# Patient Record
Sex: MALE | Race: WHITE | NOT HISPANIC OR LATINO | Employment: OTHER | URBAN - METROPOLITAN AREA
[De-identification: names, ages, dates, MRNs, and addresses within clinical notes are randomized per-mention and may not be internally consistent; named-entity substitution may affect disease eponyms.]

---

## 2017-05-09 ENCOUNTER — AMBULATORY - HEALTHEAST (OUTPATIENT)
Dept: INTERNAL MEDICINE | Facility: CLINIC | Age: 67
End: 2017-05-09

## 2017-05-09 DIAGNOSIS — Z12.11 SCREEN FOR COLON CANCER: ICD-10-CM

## 2017-06-01 ENCOUNTER — RECORDS - HEALTHEAST (OUTPATIENT)
Dept: ADMINISTRATIVE | Facility: OTHER | Age: 67
End: 2017-06-01

## 2017-06-06 ENCOUNTER — OFFICE VISIT - HEALTHEAST (OUTPATIENT)
Dept: INTERNAL MEDICINE | Facility: CLINIC | Age: 67
End: 2017-06-06

## 2017-06-06 DIAGNOSIS — H57.9 ITCHY EYES: ICD-10-CM

## 2017-06-06 DIAGNOSIS — Z78.9 NONSMOKER: ICD-10-CM

## 2017-06-06 DIAGNOSIS — R06.02 SHORTNESS OF BREATH: ICD-10-CM

## 2017-06-06 DIAGNOSIS — Z12.5 SCREENING FOR PROSTATE CANCER: ICD-10-CM

## 2017-06-06 DIAGNOSIS — Z00.00 MEDICARE ANNUAL WELLNESS VISIT, SUBSEQUENT: ICD-10-CM

## 2017-06-06 DIAGNOSIS — I35.0 AORTIC STENOSIS: ICD-10-CM

## 2017-06-06 DIAGNOSIS — Z78.9 FULL CODE STATUS: ICD-10-CM

## 2017-06-06 DIAGNOSIS — N52.9 ED (ERECTILE DYSFUNCTION): ICD-10-CM

## 2017-06-06 DIAGNOSIS — E78.5 HLD (HYPERLIPIDEMIA): ICD-10-CM

## 2017-06-06 DIAGNOSIS — M17.0 OSTEOARTHRITIS OF BOTH KNEES: ICD-10-CM

## 2017-06-06 DIAGNOSIS — Z86.69 H/O IRITIS: ICD-10-CM

## 2017-06-06 LAB
CHOLEST SERPL-MCNC: 188 MG/DL
FASTING STATUS PATIENT QL REPORTED: ABNORMAL
HDLC SERPL-MCNC: 43 MG/DL
LDLC SERPL CALC-MCNC: 110 MG/DL
PSA SERPL-MCNC: 2.2 NG/ML (ref 0–4.5)
TRIGL SERPL-MCNC: 176 MG/DL

## 2017-06-06 ASSESSMENT — MIFFLIN-ST. JEOR: SCORE: 1912.94

## 2017-06-13 ENCOUNTER — RECORDS - HEALTHEAST (OUTPATIENT)
Dept: ADMINISTRATIVE | Facility: OTHER | Age: 67
End: 2017-06-13

## 2017-06-28 ENCOUNTER — RECORDS - HEALTHEAST (OUTPATIENT)
Dept: ADMINISTRATIVE | Facility: OTHER | Age: 67
End: 2017-06-28

## 2017-06-29 ENCOUNTER — RECORDS - HEALTHEAST (OUTPATIENT)
Dept: ADMINISTRATIVE | Facility: OTHER | Age: 67
End: 2017-06-29

## 2017-08-17 ENCOUNTER — COMMUNICATION - HEALTHEAST (OUTPATIENT)
Dept: INTERNAL MEDICINE | Facility: CLINIC | Age: 67
End: 2017-08-17

## 2017-08-17 DIAGNOSIS — E78.5 HLD (HYPERLIPIDEMIA): ICD-10-CM

## 2018-06-05 ENCOUNTER — OFFICE VISIT - HEALTHEAST (OUTPATIENT)
Dept: INTERNAL MEDICINE | Facility: CLINIC | Age: 68
End: 2018-06-05

## 2018-06-05 DIAGNOSIS — N52.9 ED (ERECTILE DYSFUNCTION): ICD-10-CM
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
## 2018-06-05 DIAGNOSIS — Z78.9 FULL CODE STATUS: ICD-10-CM

## 2018-06-05 DIAGNOSIS — H91.90 HEARING LOSS: ICD-10-CM

## 2018-06-05 DIAGNOSIS — I35.0 AORTIC STENOSIS: ICD-10-CM

## 2018-06-05 DIAGNOSIS — Z00.00 MEDICARE ANNUAL WELLNESS VISIT, SUBSEQUENT: ICD-10-CM

## 2018-06-05 DIAGNOSIS — M17.0 OSTEOARTHRITIS OF BOTH KNEES: ICD-10-CM

## 2018-06-05 DIAGNOSIS — Z78.9 NONSMOKER: ICD-10-CM

## 2018-06-05 DIAGNOSIS — E78.5 HLD (HYPERLIPIDEMIA): ICD-10-CM

## 2018-06-05 DIAGNOSIS — Z71.89 ADVANCE DIRECTIVE DISCUSSED WITH PATIENT: ICD-10-CM

## 2018-06-05 DIAGNOSIS — R06.00 DYSPNEA: ICD-10-CM

## 2018-06-05 LAB
ALBUMIN SERPL-MCNC: 4.2 G/DL (ref 3.5–5)
ALP SERPL-CCNC: 89 U/L (ref 45–120)
ALT SERPL W P-5'-P-CCNC: 16 U/L (ref 0–45)
ANION GAP SERPL CALCULATED.3IONS-SCNC: 9 MMOL/L (ref 5–18)
AST SERPL W P-5'-P-CCNC: 22 U/L (ref 0–40)
BILIRUB SERPL-MCNC: 0.7 MG/DL (ref 0–1)
BNP SERPL-MCNC: 28 PG/ML (ref 0–64)
BUN SERPL-MCNC: 15 MG/DL (ref 8–22)
CALCIUM SERPL-MCNC: 9.6 MG/DL (ref 8.5–10.5)
CHLORIDE BLD-SCNC: 108 MMOL/L (ref 98–107)
CHOLEST SERPL-MCNC: 184 MG/DL
CO2 SERPL-SCNC: 25 MMOL/L (ref 22–31)
CREAT SERPL-MCNC: 1.08 MG/DL (ref 0.7–1.3)
ERYTHROCYTE [DISTWIDTH] IN BLOOD BY AUTOMATED COUNT: 11.1 % (ref 11–14.5)
FASTING STATUS PATIENT QL REPORTED: YES
GFR SERPL CREATININE-BSD FRML MDRD: >60 ML/MIN/1.73M2
GLUCOSE BLD-MCNC: 100 MG/DL (ref 70–125)
HCT VFR BLD AUTO: 46.9 % (ref 40–54)
HDLC SERPL-MCNC: 44 MG/DL
HGB BLD-MCNC: 15.8 G/DL (ref 14–18)
LDLC SERPL CALC-MCNC: 111 MG/DL
MCH RBC QN AUTO: 31.8 PG (ref 27–34)
MCHC RBC AUTO-ENTMCNC: 33.6 G/DL (ref 32–36)
MCV RBC AUTO: 95 FL (ref 80–100)
PLATELET # BLD AUTO: 189 THOU/UL (ref 140–440)
PMV BLD AUTO: 7.9 FL (ref 7–10)
POTASSIUM BLD-SCNC: 4.6 MMOL/L (ref 3.5–5)
PROT SERPL-MCNC: 7.2 G/DL (ref 6–8)
RBC # BLD AUTO: 4.96 MILL/UL (ref 4.4–6.2)
SODIUM SERPL-SCNC: 142 MMOL/L (ref 136–145)
TRIGL SERPL-MCNC: 143 MG/DL
TSH SERPL DL<=0.005 MIU/L-ACNC: 1.95 UIU/ML (ref 0.3–5)
WBC: 8 THOU/UL (ref 4–11)

## 2018-06-05 ASSESSMENT — MIFFLIN-ST. JEOR: SCORE: 1832.53

## 2018-06-06 ENCOUNTER — RECORDS - HEALTHEAST (OUTPATIENT)
Dept: ADMINISTRATIVE | Facility: OTHER | Age: 68
End: 2018-06-06

## 2018-06-15 ENCOUNTER — RECORDS - HEALTHEAST (OUTPATIENT)
Dept: ADMINISTRATIVE | Facility: OTHER | Age: 68
End: 2018-06-15

## 2018-06-26 ENCOUNTER — COMMUNICATION - HEALTHEAST (OUTPATIENT)
Dept: AUDIOLOGY | Facility: CLINIC | Age: 68
End: 2018-06-26

## 2019-06-07 ENCOUNTER — OFFICE VISIT - HEALTHEAST (OUTPATIENT)
Dept: INTERNAL MEDICINE | Facility: CLINIC | Age: 69
End: 2019-06-07

## 2019-06-07 DIAGNOSIS — E78.2 MIXED HYPERLIPIDEMIA: ICD-10-CM

## 2019-06-07 DIAGNOSIS — R20.2 TINGLING OF BOTH FEET: ICD-10-CM

## 2019-06-07 DIAGNOSIS — M17.0 PRIMARY OSTEOARTHRITIS OF BOTH KNEES: ICD-10-CM

## 2019-06-07 DIAGNOSIS — R79.89 OTHER SPECIFIED ABNORMAL FINDINGS OF BLOOD CHEMISTRY: ICD-10-CM

## 2019-06-07 DIAGNOSIS — R73.9 ELEVATED BLOOD SUGAR: ICD-10-CM

## 2019-06-07 DIAGNOSIS — Z00.00 MEDICARE ANNUAL WELLNESS VISIT, SUBSEQUENT: ICD-10-CM

## 2019-06-07 DIAGNOSIS — I35.0 NONRHEUMATIC AORTIC VALVE STENOSIS: ICD-10-CM

## 2019-06-07 DIAGNOSIS — E66.01 MORBID OBESITY (H): ICD-10-CM

## 2019-06-07 DIAGNOSIS — Z12.5 SCREENING FOR PROSTATE CANCER: ICD-10-CM

## 2019-06-07 LAB
ALBUMIN SERPL-MCNC: 4 G/DL (ref 3.5–5)
ALP SERPL-CCNC: 75 U/L (ref 45–120)
ALT SERPL W P-5'-P-CCNC: 17 U/L (ref 0–45)
AST SERPL W P-5'-P-CCNC: 20 U/L (ref 0–40)
BILIRUB DIRECT SERPL-MCNC: 0.2 MG/DL
BILIRUB SERPL-MCNC: 0.4 MG/DL (ref 0–1)
HBA1C MFR BLD: 5.7 % (ref 3.5–6)
IRON SATN MFR SERPL: 38 % (ref 20–50)
IRON SERPL-MCNC: 122 UG/DL (ref 42–175)
PROT SERPL-MCNC: 6.8 G/DL (ref 6–8)
PSA SERPL-MCNC: 2 NG/ML (ref 0–4.5)
TIBC SERPL-MCNC: 325 UG/DL (ref 313–563)
TRANSFERRIN SERPL-MCNC: 260 MG/DL (ref 212–360)
VIT B12 SERPL-MCNC: 234 PG/ML (ref 213–816)

## 2019-06-07 ASSESSMENT — MIFFLIN-ST. JEOR: SCORE: 1836.61

## 2019-06-11 LAB — METHYLMALONATE SERPL-SCNC: 0.59 UMOL/L (ref 0–0.4)

## 2019-09-06 ENCOUNTER — RECORDS - HEALTHEAST (OUTPATIENT)
Dept: ADMINISTRATIVE | Facility: OTHER | Age: 69
End: 2019-09-06

## 2019-09-07 ENCOUNTER — RECORDS - HEALTHEAST (OUTPATIENT)
Dept: ADMINISTRATIVE | Facility: OTHER | Age: 69
End: 2019-09-07

## 2019-09-08 ENCOUNTER — COMMUNICATION - HEALTHEAST (OUTPATIENT)
Dept: INTERNAL MEDICINE | Facility: CLINIC | Age: 69
End: 2019-09-08

## 2019-09-09 ENCOUNTER — RECORDS - HEALTHEAST (OUTPATIENT)
Dept: ADMINISTRATIVE | Facility: OTHER | Age: 69
End: 2019-09-09

## 2019-09-10 ENCOUNTER — RECORDS - HEALTHEAST (OUTPATIENT)
Dept: ADMINISTRATIVE | Facility: OTHER | Age: 69
End: 2019-09-10

## 2019-09-11 ENCOUNTER — RECORDS - HEALTHEAST (OUTPATIENT)
Dept: ADMINISTRATIVE | Facility: OTHER | Age: 69
End: 2019-09-11

## 2019-09-16 ENCOUNTER — AMBULATORY - HEALTHEAST (OUTPATIENT)
Dept: INTERNAL MEDICINE | Facility: CLINIC | Age: 69
End: 2019-09-16

## 2019-09-16 ENCOUNTER — COMMUNICATION - HEALTHEAST (OUTPATIENT)
Dept: INTERNAL MEDICINE | Facility: CLINIC | Age: 69
End: 2019-09-16

## 2019-10-02 ENCOUNTER — RECORDS - HEALTHEAST (OUTPATIENT)
Dept: ADMINISTRATIVE | Facility: OTHER | Age: 69
End: 2019-10-02

## 2019-10-04 ENCOUNTER — COMMUNICATION - HEALTHEAST (OUTPATIENT)
Dept: INTERNAL MEDICINE | Facility: CLINIC | Age: 69
End: 2019-10-04

## 2019-10-09 ENCOUNTER — RECORDS - HEALTHEAST (OUTPATIENT)
Dept: LAB | Facility: CLINIC | Age: 69
End: 2019-10-09

## 2019-10-09 LAB
ANION GAP SERPL CALCULATED.3IONS-SCNC: 10 MMOL/L (ref 5–18)
BUN SERPL-MCNC: 14 MG/DL (ref 8–22)
CALCIUM SERPL-MCNC: 10.4 MG/DL (ref 8.5–10.5)
CHLORIDE BLD-SCNC: 106 MMOL/L (ref 98–107)
CO2 SERPL-SCNC: 25 MMOL/L (ref 22–31)
CREAT SERPL-MCNC: 1.19 MG/DL (ref 0.7–1.3)
GFR SERPL CREATININE-BSD FRML MDRD: >60 ML/MIN/1.73M2
GLUCOSE BLD-MCNC: 141 MG/DL (ref 70–125)
POTASSIUM BLD-SCNC: 3.6 MMOL/L (ref 3.5–5)
SODIUM SERPL-SCNC: 141 MMOL/L (ref 136–145)

## 2019-10-15 ENCOUNTER — RECORDS - HEALTHEAST (OUTPATIENT)
Dept: LAB | Facility: CLINIC | Age: 69
End: 2019-10-15

## 2019-10-15 LAB
ALBUMIN UR-MCNC: ABNORMAL MG/DL
APPEARANCE UR: ABNORMAL
BACTERIA #/AREA URNS HPF: ABNORMAL HPF
BILIRUB UR QL STRIP: NEGATIVE
COLOR UR AUTO: YELLOW
GLUCOSE UR STRIP-MCNC: NEGATIVE MG/DL
HGB UR QL STRIP: ABNORMAL
KETONES UR STRIP-MCNC: NEGATIVE MG/DL
LEUKOCYTE ESTERASE UR QL STRIP: ABNORMAL
MUCOUS THREADS #/AREA URNS LPF: ABNORMAL LPF
NITRATE UR QL: NEGATIVE
PH UR STRIP: 5 [PH] (ref 4.5–8)
RBC #/AREA URNS AUTO: ABNORMAL HPF
SP GR UR STRIP: 1.02 (ref 1–1.03)
SQUAMOUS #/AREA URNS AUTO: ABNORMAL LPF
UROBILINOGEN UR STRIP-ACNC: ABNORMAL
WBC #/AREA URNS AUTO: >100 HPF
WBC CLUMPS #/AREA URNS HPF: PRESENT /[HPF]

## 2019-10-17 LAB — BACTERIA SPEC CULT: ABNORMAL

## 2019-10-28 ENCOUNTER — AMBULATORY - HEALTHEAST (OUTPATIENT)
Dept: INTERNAL MEDICINE | Facility: CLINIC | Age: 69
End: 2019-10-28

## 2019-11-12 ENCOUNTER — COMMUNICATION - HEALTHEAST (OUTPATIENT)
Dept: INTERNAL MEDICINE | Facility: CLINIC | Age: 69
End: 2019-11-12

## 2019-11-19 ENCOUNTER — COMMUNICATION - HEALTHEAST (OUTPATIENT)
Dept: INTERNAL MEDICINE | Facility: CLINIC | Age: 69
End: 2019-11-19

## 2019-12-03 ENCOUNTER — RECORDS - HEALTHEAST (OUTPATIENT)
Dept: LAB | Facility: CLINIC | Age: 69
End: 2019-12-03

## 2019-12-03 LAB
ANION GAP SERPL CALCULATED.3IONS-SCNC: 10 MMOL/L (ref 5–18)
BASOPHILS # BLD AUTO: 0.1 THOU/UL (ref 0–0.2)
BASOPHILS NFR BLD AUTO: 1 % (ref 0–2)
BUN SERPL-MCNC: 10 MG/DL (ref 8–22)
C REACTIVE PROTEIN LHE: 0.4 MG/DL (ref 0–0.8)
CALCIUM SERPL-MCNC: 9.8 MG/DL (ref 8.5–10.5)
CHLORIDE BLD-SCNC: 105 MMOL/L (ref 98–107)
CO2 SERPL-SCNC: 26 MMOL/L (ref 22–31)
CREAT SERPL-MCNC: 0.98 MG/DL (ref 0.7–1.3)
EOSINOPHIL # BLD AUTO: 0.3 THOU/UL (ref 0–0.4)
EOSINOPHIL NFR BLD AUTO: 3 % (ref 0–6)
ERYTHROCYTE [DISTWIDTH] IN BLOOD BY AUTOMATED COUNT: 13.7 % (ref 11–14.5)
GFR SERPL CREATININE-BSD FRML MDRD: >60 ML/MIN/1.73M2
GLUCOSE BLD-MCNC: 141 MG/DL (ref 70–125)
HCT VFR BLD AUTO: 45.6 % (ref 40–54)
HGB BLD-MCNC: 15.4 G/DL (ref 14–18)
LYMPHOCYTES # BLD AUTO: 3.3 THOU/UL (ref 0.8–4.4)
LYMPHOCYTES NFR BLD AUTO: 27 % (ref 20–40)
MCH RBC QN AUTO: 31.4 PG (ref 27–34)
MCHC RBC AUTO-ENTMCNC: 33.8 G/DL (ref 32–36)
MCV RBC AUTO: 93 FL (ref 80–100)
MONOCYTES # BLD AUTO: 0.8 THOU/UL (ref 0–0.9)
MONOCYTES NFR BLD AUTO: 6 % (ref 2–10)
NEUTROPHILS # BLD AUTO: 7.8 THOU/UL (ref 2–7.7)
NEUTROPHILS NFR BLD AUTO: 64 % (ref 50–70)
PLATELET # BLD AUTO: 296 THOU/UL (ref 140–440)
PMV BLD AUTO: 11.5 FL (ref 8.5–12.5)
POTASSIUM BLD-SCNC: 3.6 MMOL/L (ref 3.5–5)
RBC # BLD AUTO: 4.9 MILL/UL (ref 4.4–6.2)
SODIUM SERPL-SCNC: 141 MMOL/L (ref 136–145)
WBC: 12.3 THOU/UL (ref 4–11)

## 2019-12-05 LAB
ALBUMIN PERCENT: 60.3 % (ref 51–67)
ALBUMIN SERPL ELPH-MCNC: 4.1 G/DL (ref 3.2–4.7)
ALPHA 1 PERCENT: 2 % (ref 2–4)
ALPHA 2 PERCENT: 12.9 % (ref 5–13)
ALPHA1 GLOB SERPL ELPH-MCNC: 0.1 G/DL (ref 0.1–0.3)
ALPHA2 GLOB SERPL ELPH-MCNC: 0.9 G/DL (ref 0.4–0.9)
B-GLOBULIN SERPL ELPH-MCNC: 1 G/DL (ref 0.7–1.2)
BETA PERCENT: 14.1 % (ref 10–17)
GAMMA GLOB SERPL ELPH-MCNC: 0.7 G/DL (ref 0.6–1.4)
GAMMA GLOBULIN PERCENT: 10.7 % (ref 9–20)
PATH ICD:: NORMAL
PROT PATTERN SERPL ELPH-IMP: NORMAL
PROT SERPL-MCNC: 6.8 G/DL (ref 6–8)
REVIEWING PATHOLOGIST: NORMAL

## 2019-12-09 ENCOUNTER — OFFICE VISIT - HEALTHEAST (OUTPATIENT)
Dept: INTERNAL MEDICINE | Facility: CLINIC | Age: 69
End: 2019-12-09

## 2019-12-09 ENCOUNTER — RECORDS - HEALTHEAST (OUTPATIENT)
Dept: ADMINISTRATIVE | Facility: OTHER | Age: 69
End: 2019-12-09

## 2019-12-09 DIAGNOSIS — I63.411 CEREBROVASCULAR ACCIDENT (CVA) DUE TO EMBOLISM OF RIGHT MIDDLE CEREBRAL ARTERY (H): ICD-10-CM

## 2019-12-09 DIAGNOSIS — I35.0 NONRHEUMATIC AORTIC VALVE STENOSIS: ICD-10-CM

## 2019-12-09 DIAGNOSIS — S42.152A GLENOID FRACTURE OF SHOULDER, LEFT, CLOSED, INITIAL ENCOUNTER: ICD-10-CM

## 2019-12-09 DIAGNOSIS — M17.0 PRIMARY OSTEOARTHRITIS OF BOTH KNEES: ICD-10-CM

## 2019-12-09 DIAGNOSIS — E66.01 MORBID OBESITY (H): ICD-10-CM

## 2019-12-09 DIAGNOSIS — E78.2 MIXED HYPERLIPIDEMIA: ICD-10-CM

## 2019-12-09 DIAGNOSIS — M25.532 LEFT WRIST PAIN: ICD-10-CM

## 2019-12-09 DIAGNOSIS — I69.359 HEMIPLEGIA AS LATE EFFECT OF CEREBROVASCULAR ACCIDENT (CVA) (H): ICD-10-CM

## 2019-12-09 DIAGNOSIS — S42.142A GLENOID FRACTURE OF SHOULDER, LEFT, CLOSED, INITIAL ENCOUNTER: ICD-10-CM

## 2019-12-09 DIAGNOSIS — S42.255A CLOSED NONDISPLACED FRACTURE OF GREATER TUBEROSITY OF LEFT HUMERUS, INITIAL ENCOUNTER: ICD-10-CM

## 2019-12-09 DIAGNOSIS — I69.391 DYSPHAGIA AS LATE EFFECT OF CEREBROVASCULAR ACCIDENT (CVA): ICD-10-CM

## 2020-01-12 ENCOUNTER — RECORDS - HEALTHEAST (OUTPATIENT)
Dept: ADMINISTRATIVE | Facility: OTHER | Age: 70
End: 2020-01-12

## 2020-01-15 ENCOUNTER — RECORDS - HEALTHEAST (OUTPATIENT)
Dept: ADMINISTRATIVE | Facility: OTHER | Age: 70
End: 2020-01-15

## 2020-01-16 ENCOUNTER — RECORDS - HEALTHEAST (OUTPATIENT)
Dept: ADMINISTRATIVE | Facility: OTHER | Age: 70
End: 2020-01-16

## 2020-01-27 ENCOUNTER — RECORDS - HEALTHEAST (OUTPATIENT)
Dept: LAB | Facility: CLINIC | Age: 70
End: 2020-01-27

## 2020-01-28 LAB
ANION GAP SERPL CALCULATED.3IONS-SCNC: 11 MMOL/L (ref 5–18)
BUN SERPL-MCNC: 9 MG/DL (ref 8–28)
CALCIUM SERPL-MCNC: 10.2 MG/DL (ref 8.5–10.5)
CHLORIDE BLD-SCNC: 103 MMOL/L (ref 98–107)
CO2 SERPL-SCNC: 26 MMOL/L (ref 22–31)
CREAT SERPL-MCNC: 0.97 MG/DL (ref 0.7–1.3)
ERYTHROCYTE [DISTWIDTH] IN BLOOD BY AUTOMATED COUNT: 15.5 % (ref 11–14.5)
GFR SERPL CREATININE-BSD FRML MDRD: >60 ML/MIN/1.73M2
GLUCOSE BLD-MCNC: 124 MG/DL (ref 70–125)
HCT VFR BLD AUTO: 38.4 % (ref 40–54)
HGB BLD-MCNC: 12.1 G/DL (ref 14–18)
MCH RBC QN AUTO: 31.2 PG (ref 27–34)
MCHC RBC AUTO-ENTMCNC: 31.5 G/DL (ref 32–36)
MCV RBC AUTO: 99 FL (ref 80–100)
PLATELET # BLD AUTO: 541 THOU/UL (ref 140–440)
PMV BLD AUTO: 11.1 FL (ref 8.5–12.5)
POTASSIUM BLD-SCNC: 3.6 MMOL/L (ref 3.5–5)
RBC # BLD AUTO: 3.88 MILL/UL (ref 4.4–6.2)
SODIUM SERPL-SCNC: 140 MMOL/L (ref 136–145)
WBC: 11.2 THOU/UL (ref 4–11)

## 2020-03-23 ENCOUNTER — COMMUNICATION - HEALTHEAST (OUTPATIENT)
Dept: INTERNAL MEDICINE | Facility: CLINIC | Age: 70
End: 2020-03-23

## 2020-03-23 ENCOUNTER — RECORDS - HEALTHEAST (OUTPATIENT)
Dept: ADMINISTRATIVE | Facility: OTHER | Age: 70
End: 2020-03-23

## 2020-03-23 DIAGNOSIS — Z95.3 S/P AORTIC VALVE REPLACEMENT WITH BIOPROSTHETIC VALVE: ICD-10-CM

## 2020-03-23 LAB — INR PPP: 2.3 (ref 0.9–1.1)

## 2020-03-27 ENCOUNTER — RECORDS - HEALTHEAST (OUTPATIENT)
Dept: ADMINISTRATIVE | Facility: OTHER | Age: 70
End: 2020-03-27

## 2020-03-27 ENCOUNTER — COMMUNICATION - HEALTHEAST (OUTPATIENT)
Dept: INTERNAL MEDICINE | Facility: CLINIC | Age: 70
End: 2020-03-27

## 2020-03-27 ENCOUNTER — OFFICE VISIT - HEALTHEAST (OUTPATIENT)
Dept: INTERNAL MEDICINE | Facility: CLINIC | Age: 70
End: 2020-03-27

## 2020-03-27 DIAGNOSIS — E78.2 MIXED HYPERLIPIDEMIA: ICD-10-CM

## 2020-03-27 DIAGNOSIS — N13.8 BENIGN PROSTATIC HYPERPLASIA WITH URINARY OBSTRUCTION: ICD-10-CM

## 2020-03-27 DIAGNOSIS — N40.1 BENIGN PROSTATIC HYPERPLASIA WITH URINARY OBSTRUCTION: ICD-10-CM

## 2020-03-27 DIAGNOSIS — F32.1 MODERATE MAJOR DEPRESSION (H): ICD-10-CM

## 2020-03-27 DIAGNOSIS — M17.0 PRIMARY OSTEOARTHRITIS OF BOTH KNEES: ICD-10-CM

## 2020-03-27 DIAGNOSIS — K59.00 CONSTIPATION, UNSPECIFIED CONSTIPATION TYPE: ICD-10-CM

## 2020-03-27 DIAGNOSIS — E66.01 MORBID OBESITY (H): ICD-10-CM

## 2020-03-27 DIAGNOSIS — I63.411 CEREBROVASCULAR ACCIDENT (CVA) DUE TO EMBOLISM OF RIGHT MIDDLE CEREBRAL ARTERY (H): ICD-10-CM

## 2020-03-27 DIAGNOSIS — I69.359 HEMIPLEGIA AS LATE EFFECT OF CEREBROVASCULAR ACCIDENT (CVA) (H): ICD-10-CM

## 2020-03-27 DIAGNOSIS — I10 ESSENTIAL HYPERTENSION: ICD-10-CM

## 2020-03-27 DIAGNOSIS — Z95.3 S/P AORTIC VALVE REPLACEMENT WITH BIOPROSTHETIC VALVE: ICD-10-CM

## 2020-03-30 ENCOUNTER — COMMUNICATION - HEALTHEAST (OUTPATIENT)
Dept: INTERNAL MEDICINE | Facility: CLINIC | Age: 70
End: 2020-03-30

## 2020-03-30 DIAGNOSIS — Z95.3 S/P AORTIC VALVE REPLACEMENT WITH BIOPROSTHETIC VALVE: ICD-10-CM

## 2020-03-30 LAB — INR PPP: 2.3 (ref 0.9–1.1)

## 2020-04-01 ENCOUNTER — COMMUNICATION - HEALTHEAST (OUTPATIENT)
Dept: INTERNAL MEDICINE | Facility: CLINIC | Age: 70
End: 2020-04-01

## 2020-04-02 ENCOUNTER — COMMUNICATION - HEALTHEAST (OUTPATIENT)
Dept: INTERNAL MEDICINE | Facility: CLINIC | Age: 70
End: 2020-04-02

## 2020-04-02 DIAGNOSIS — F32.1 MODERATE MAJOR DEPRESSION (H): ICD-10-CM

## 2020-04-03 ENCOUNTER — RECORDS - HEALTHEAST (OUTPATIENT)
Dept: ADMINISTRATIVE | Facility: OTHER | Age: 70
End: 2020-04-03

## 2020-04-06 ENCOUNTER — COMMUNICATION - HEALTHEAST (OUTPATIENT)
Dept: INTERNAL MEDICINE | Facility: CLINIC | Age: 70
End: 2020-04-06

## 2020-04-06 DIAGNOSIS — Z95.3 S/P AORTIC VALVE REPLACEMENT WITH BIOPROSTHETIC VALVE: ICD-10-CM

## 2020-04-06 LAB — INR PPP: 2 (ref 0.9–1.1)

## 2020-04-07 ENCOUNTER — RECORDS - HEALTHEAST (OUTPATIENT)
Dept: ADMINISTRATIVE | Facility: OTHER | Age: 70
End: 2020-04-07

## 2020-04-13 ENCOUNTER — COMMUNICATION - HEALTHEAST (OUTPATIENT)
Dept: FAMILY MEDICINE | Facility: CLINIC | Age: 70
End: 2020-04-13

## 2020-04-13 ENCOUNTER — COMMUNICATION - HEALTHEAST (OUTPATIENT)
Dept: INTERNAL MEDICINE | Facility: CLINIC | Age: 70
End: 2020-04-13

## 2020-04-13 DIAGNOSIS — Z95.3 S/P AORTIC VALVE REPLACEMENT WITH BIOPROSTHETIC VALVE: ICD-10-CM

## 2020-04-13 DIAGNOSIS — I10 ESSENTIAL HYPERTENSION: ICD-10-CM

## 2020-04-13 LAB — INR PPP: 2.2 (ref 0.9–1.1)

## 2020-04-17 ENCOUNTER — RECORDS - HEALTHEAST (OUTPATIENT)
Dept: ANTICOAGULATION | Facility: CLINIC | Age: 70
End: 2020-04-17

## 2020-04-20 ENCOUNTER — RECORDS - HEALTHEAST (OUTPATIENT)
Dept: ADMINISTRATIVE | Facility: OTHER | Age: 70
End: 2020-04-20

## 2020-04-27 ENCOUNTER — RECORDS - HEALTHEAST (OUTPATIENT)
Dept: ADMINISTRATIVE | Facility: OTHER | Age: 70
End: 2020-04-27

## 2020-04-27 ENCOUNTER — COMMUNICATION - HEALTHEAST (OUTPATIENT)
Dept: INTERNAL MEDICINE | Facility: CLINIC | Age: 70
End: 2020-04-27

## 2020-04-27 DIAGNOSIS — Z95.3 S/P AORTIC VALVE REPLACEMENT WITH BIOPROSTHETIC VALVE: ICD-10-CM

## 2020-04-27 LAB — INR PPP: 2.1 (ref 0.9–1.1)

## 2020-04-28 ENCOUNTER — COMMUNICATION - HEALTHEAST (OUTPATIENT)
Dept: INTERNAL MEDICINE | Facility: CLINIC | Age: 70
End: 2020-04-28

## 2020-04-28 DIAGNOSIS — Z95.3 S/P AORTIC VALVE REPLACEMENT WITH BIOPROSTHETIC VALVE: ICD-10-CM

## 2020-04-28 DIAGNOSIS — I63.411 CEREBROVASCULAR ACCIDENT (CVA) DUE TO EMBOLISM OF RIGHT MIDDLE CEREBRAL ARTERY (H): ICD-10-CM

## 2020-04-28 DIAGNOSIS — I69.391 DYSPHAGIA AS LATE EFFECT OF CEREBROVASCULAR ACCIDENT (CVA): ICD-10-CM

## 2020-04-28 DIAGNOSIS — I69.359 HEMIPLEGIA AS LATE EFFECT OF CEREBROVASCULAR ACCIDENT (CVA) (H): ICD-10-CM

## 2020-05-01 ENCOUNTER — RECORDS - HEALTHEAST (OUTPATIENT)
Dept: ADMINISTRATIVE | Facility: OTHER | Age: 70
End: 2020-05-01

## 2020-05-03 ENCOUNTER — RECORDS - HEALTHEAST (OUTPATIENT)
Dept: ADMINISTRATIVE | Facility: OTHER | Age: 70
End: 2020-05-03

## 2020-05-05 ENCOUNTER — RECORDS - HEALTHEAST (OUTPATIENT)
Dept: ADMINISTRATIVE | Facility: OTHER | Age: 70
End: 2020-05-05

## 2020-05-19 ENCOUNTER — COMMUNICATION - HEALTHEAST (OUTPATIENT)
Dept: ANTICOAGULATION | Facility: CLINIC | Age: 70
End: 2020-05-19

## 2020-05-21 ENCOUNTER — COMMUNICATION - HEALTHEAST (OUTPATIENT)
Dept: INTERNAL MEDICINE | Facility: CLINIC | Age: 70
End: 2020-05-21

## 2020-05-21 DIAGNOSIS — F32.1 MODERATE MAJOR DEPRESSION (H): ICD-10-CM

## 2020-05-26 ENCOUNTER — COMMUNICATION - HEALTHEAST (OUTPATIENT)
Dept: ANTICOAGULATION | Facility: CLINIC | Age: 70
End: 2020-05-26

## 2020-05-26 DIAGNOSIS — Z95.3 S/P AORTIC VALVE REPLACEMENT WITH BIOPROSTHETIC VALVE: ICD-10-CM

## 2020-05-26 LAB — INR PPP: 2 (ref 0.9–1.1)

## 2020-06-09 ENCOUNTER — RECORDS - HEALTHEAST (OUTPATIENT)
Dept: ADMINISTRATIVE | Facility: OTHER | Age: 70
End: 2020-06-09

## 2020-06-23 ENCOUNTER — COMMUNICATION - HEALTHEAST (OUTPATIENT)
Dept: ANTICOAGULATION | Facility: CLINIC | Age: 70
End: 2020-06-23

## 2020-06-23 DIAGNOSIS — Z95.3 S/P AORTIC VALVE REPLACEMENT WITH BIOPROSTHETIC VALVE: ICD-10-CM

## 2020-06-23 LAB — INR PPP: 1.8 (ref 0.9–1.1)

## 2020-06-24 ENCOUNTER — COMMUNICATION - HEALTHEAST (OUTPATIENT)
Dept: INTERNAL MEDICINE | Facility: CLINIC | Age: 70
End: 2020-06-24

## 2020-06-24 ENCOUNTER — AMBULATORY - HEALTHEAST (OUTPATIENT)
Dept: INTERNAL MEDICINE | Facility: CLINIC | Age: 70
End: 2020-06-24

## 2020-06-24 DIAGNOSIS — F32.1 MODERATE MAJOR DEPRESSION (H): ICD-10-CM

## 2020-07-16 ENCOUNTER — COMMUNICATION - HEALTHEAST (OUTPATIENT)
Dept: ANTICOAGULATION | Facility: CLINIC | Age: 70
End: 2020-07-16

## 2020-07-16 DIAGNOSIS — Z95.3 S/P AORTIC VALVE REPLACEMENT WITH BIOPROSTHETIC VALVE: ICD-10-CM

## 2020-07-31 ENCOUNTER — OFFICE VISIT - HEALTHEAST (OUTPATIENT)
Dept: INTERNAL MEDICINE | Facility: CLINIC | Age: 70
End: 2020-07-31

## 2020-07-31 DIAGNOSIS — M17.0 PRIMARY OSTEOARTHRITIS OF BOTH KNEES: ICD-10-CM

## 2020-07-31 DIAGNOSIS — Z95.3 S/P AORTIC VALVE REPLACEMENT WITH BIOPROSTHETIC VALVE: ICD-10-CM

## 2020-07-31 DIAGNOSIS — I69.359 HEMIPLEGIA AS LATE EFFECT OF CEREBROVASCULAR ACCIDENT (CVA) (H): ICD-10-CM

## 2020-07-31 DIAGNOSIS — I10 ESSENTIAL HYPERTENSION: ICD-10-CM

## 2020-07-31 DIAGNOSIS — N40.1 BENIGN PROSTATIC HYPERPLASIA WITH URINARY OBSTRUCTION: ICD-10-CM

## 2020-07-31 DIAGNOSIS — K59.00 CONSTIPATION, UNSPECIFIED CONSTIPATION TYPE: ICD-10-CM

## 2020-07-31 DIAGNOSIS — I69.391 DYSPHAGIA AS LATE EFFECT OF CEREBROVASCULAR ACCIDENT (CVA): ICD-10-CM

## 2020-07-31 DIAGNOSIS — N13.8 BENIGN PROSTATIC HYPERPLASIA WITH URINARY OBSTRUCTION: ICD-10-CM

## 2020-07-31 DIAGNOSIS — E78.2 MIXED HYPERLIPIDEMIA: ICD-10-CM

## 2020-07-31 DIAGNOSIS — R73.9 ELEVATED BLOOD SUGAR: ICD-10-CM

## 2020-07-31 DIAGNOSIS — H20.11 CHRONIC IRITIS OF RIGHT EYE: ICD-10-CM

## 2020-07-31 DIAGNOSIS — F32.1 MODERATE MAJOR DEPRESSION (H): ICD-10-CM

## 2020-07-31 DIAGNOSIS — Z00.00 MEDICARE ANNUAL WELLNESS VISIT, SUBSEQUENT: ICD-10-CM

## 2020-07-31 DIAGNOSIS — I63.411 CEREBROVASCULAR ACCIDENT (CVA) DUE TO EMBOLISM OF RIGHT MIDDLE CEREBRAL ARTERY (H): ICD-10-CM

## 2020-07-31 LAB
ALBUMIN SERPL-MCNC: 4.3 G/DL (ref 3.5–5)
ALP SERPL-CCNC: 114 U/L (ref 45–120)
ALT SERPL W P-5'-P-CCNC: 16 U/L (ref 0–45)
ANION GAP SERPL CALCULATED.3IONS-SCNC: 10 MMOL/L (ref 5–18)
AST SERPL W P-5'-P-CCNC: 18 U/L (ref 0–40)
BILIRUB SERPL-MCNC: 0.8 MG/DL (ref 0–1)
BUN SERPL-MCNC: 13 MG/DL (ref 8–28)
CALCIUM SERPL-MCNC: 9.8 MG/DL (ref 8.5–10.5)
CHLORIDE BLD-SCNC: 105 MMOL/L (ref 98–107)
CHOLEST SERPL-MCNC: 115 MG/DL
CO2 SERPL-SCNC: 26 MMOL/L (ref 22–31)
CREAT SERPL-MCNC: 1.04 MG/DL (ref 0.7–1.3)
ERYTHROCYTE [DISTWIDTH] IN BLOOD BY AUTOMATED COUNT: 13.1 % (ref 11–14.5)
FASTING STATUS PATIENT QL REPORTED: YES
GFR SERPL CREATININE-BSD FRML MDRD: >60 ML/MIN/1.73M2
GLUCOSE BLD-MCNC: 116 MG/DL (ref 70–125)
HBA1C MFR BLD: 5.1 % (ref 3.5–6)
HCT VFR BLD AUTO: 46.9 % (ref 40–54)
HDLC SERPL-MCNC: 42 MG/DL
HGB BLD-MCNC: 16.1 G/DL (ref 14–18)
LDLC SERPL CALC-MCNC: 50 MG/DL
MCH RBC QN AUTO: 31.2 PG (ref 27–34)
MCHC RBC AUTO-ENTMCNC: 34.3 G/DL (ref 32–36)
MCV RBC AUTO: 91 FL (ref 80–100)
PLATELET # BLD AUTO: 177 THOU/UL (ref 140–440)
PMV BLD AUTO: 9 FL (ref 7–10)
POTASSIUM BLD-SCNC: 4.1 MMOL/L (ref 3.5–5)
PROT SERPL-MCNC: 7.2 G/DL (ref 6–8)
RBC # BLD AUTO: 5.16 MILL/UL (ref 4.4–6.2)
SODIUM SERPL-SCNC: 141 MMOL/L (ref 136–145)
TRIGL SERPL-MCNC: 116 MG/DL
WBC: 9.6 THOU/UL (ref 4–11)

## 2020-07-31 RX ORDER — METOPROLOL SUCCINATE 25 MG/1
25 TABLET, EXTENDED RELEASE ORAL DAILY
Qty: 90 TABLET | Refills: 3 | Status: SHIPPED | OUTPATIENT
Start: 2020-07-31 | End: 2022-05-05

## 2020-07-31 ASSESSMENT — ANXIETY QUESTIONNAIRES
2. NOT BEING ABLE TO STOP OR CONTROL WORRYING: NOT AT ALL
1. FEELING NERVOUS, ANXIOUS, OR ON EDGE: NOT AT ALL

## 2020-07-31 ASSESSMENT — PATIENT HEALTH QUESTIONNAIRE - PHQ9: SUM OF ALL RESPONSES TO PHQ QUESTIONS 1-9: 11

## 2020-07-31 ASSESSMENT — MIFFLIN-ST. JEOR: SCORE: 1565.25

## 2020-11-12 ENCOUNTER — AMBULATORY - HEALTHEAST (OUTPATIENT)
Dept: INTERNAL MEDICINE | Facility: CLINIC | Age: 70
End: 2020-11-12

## 2020-11-16 ENCOUNTER — COMMUNICATION - HEALTHEAST (OUTPATIENT)
Dept: INTERNAL MEDICINE | Facility: CLINIC | Age: 70
End: 2020-11-16

## 2020-11-16 DIAGNOSIS — F32.1 MODERATE MAJOR DEPRESSION (H): ICD-10-CM

## 2021-02-28 ENCOUNTER — COMMUNICATION - HEALTHEAST (OUTPATIENT)
Dept: INTERNAL MEDICINE | Facility: CLINIC | Age: 71
End: 2021-02-28

## 2021-03-30 ENCOUNTER — COMMUNICATION - HEALTHEAST (OUTPATIENT)
Dept: INTERNAL MEDICINE | Facility: CLINIC | Age: 71
End: 2021-03-30

## 2021-03-30 DIAGNOSIS — N40.1 BENIGN PROSTATIC HYPERPLASIA WITH URINARY OBSTRUCTION: ICD-10-CM

## 2021-03-30 DIAGNOSIS — N13.8 BENIGN PROSTATIC HYPERPLASIA WITH URINARY OBSTRUCTION: ICD-10-CM

## 2021-03-30 DIAGNOSIS — E78.2 MIXED HYPERLIPIDEMIA: ICD-10-CM

## 2021-03-30 RX ORDER — ATORVASTATIN CALCIUM 80 MG/1
80 TABLET, FILM COATED ORAL DAILY
Qty: 90 TABLET | Refills: 3 | Status: SHIPPED | OUTPATIENT
Start: 2021-03-30 | End: 2022-04-24

## 2021-03-30 RX ORDER — TAMSULOSIN HYDROCHLORIDE 0.4 MG/1
CAPSULE ORAL
Qty: 90 CAPSULE | Refills: 3 | Status: SHIPPED | OUTPATIENT
Start: 2021-03-30 | End: 2022-04-24

## 2021-05-24 ENCOUNTER — OFFICE VISIT - HEALTHEAST (OUTPATIENT)
Dept: INTERNAL MEDICINE | Facility: CLINIC | Age: 71
End: 2021-05-24

## 2021-05-24 DIAGNOSIS — I69.359 HEMIPLEGIA AS LATE EFFECT OF CEREBROVASCULAR ACCIDENT (CVA) (H): ICD-10-CM

## 2021-05-24 DIAGNOSIS — K59.00 CONSTIPATION, UNSPECIFIED CONSTIPATION TYPE: ICD-10-CM

## 2021-05-24 DIAGNOSIS — G60.3 IDIOPATHIC PROGRESSIVE NEUROPATHY: ICD-10-CM

## 2021-05-24 DIAGNOSIS — N13.8 BENIGN PROSTATIC HYPERPLASIA WITH URINARY OBSTRUCTION: ICD-10-CM

## 2021-05-24 DIAGNOSIS — M17.0 PRIMARY OSTEOARTHRITIS OF BOTH KNEES: ICD-10-CM

## 2021-05-24 DIAGNOSIS — I63.411 CEREBROVASCULAR ACCIDENT (CVA) DUE TO EMBOLISM OF RIGHT MIDDLE CEREBRAL ARTERY (H): ICD-10-CM

## 2021-05-24 DIAGNOSIS — F32.1 MODERATE MAJOR DEPRESSION (H): ICD-10-CM

## 2021-05-24 DIAGNOSIS — Z95.3 S/P AORTIC VALVE REPLACEMENT WITH BIOPROSTHETIC VALVE: ICD-10-CM

## 2021-05-24 DIAGNOSIS — Z12.5 SCREENING FOR PROSTATE CANCER: ICD-10-CM

## 2021-05-24 DIAGNOSIS — R20.8 BURNING SENSATION OF SKIN: ICD-10-CM

## 2021-05-24 DIAGNOSIS — N40.1 BENIGN PROSTATIC HYPERPLASIA WITH URINARY OBSTRUCTION: ICD-10-CM

## 2021-05-24 DIAGNOSIS — E78.2 MIXED HYPERLIPIDEMIA: ICD-10-CM

## 2021-05-24 DIAGNOSIS — R63.4 WEIGHT LOSS: ICD-10-CM

## 2021-05-24 LAB
ALBUMIN SERPL-MCNC: 4 G/DL (ref 3.5–5)
ALP SERPL-CCNC: 101 U/L (ref 45–120)
ALT SERPL W P-5'-P-CCNC: 16 U/L (ref 0–45)
ANION GAP SERPL CALCULATED.3IONS-SCNC: 12 MMOL/L (ref 5–18)
AST SERPL W P-5'-P-CCNC: 17 U/L (ref 0–40)
BILIRUB SERPL-MCNC: 0.8 MG/DL (ref 0–1)
BUN SERPL-MCNC: 14 MG/DL (ref 8–28)
CALCIUM SERPL-MCNC: 9.4 MG/DL (ref 8.5–10.5)
CHLORIDE BLD-SCNC: 108 MMOL/L (ref 98–107)
CHOLEST SERPL-MCNC: 125 MG/DL
CO2 SERPL-SCNC: 23 MMOL/L (ref 22–31)
CREAT SERPL-MCNC: 1.05 MG/DL (ref 0.7–1.3)
ERYTHROCYTE [DISTWIDTH] IN BLOOD BY AUTOMATED COUNT: 13.3 % (ref 11–14.5)
FASTING STATUS PATIENT QL REPORTED: NORMAL
GFR SERPL CREATININE-BSD FRML MDRD: >60 ML/MIN/1.73M2
GLUCOSE BLD-MCNC: 112 MG/DL (ref 70–125)
HCT VFR BLD AUTO: 47.6 % (ref 40–54)
HDLC SERPL-MCNC: 42 MG/DL
HGB BLD-MCNC: 16.1 G/DL (ref 14–18)
LDLC SERPL CALC-MCNC: 63 MG/DL
MCH RBC QN AUTO: 30.8 PG (ref 27–34)
MCHC RBC AUTO-ENTMCNC: 33.8 G/DL (ref 32–36)
MCV RBC AUTO: 91 FL (ref 80–100)
PLATELET # BLD AUTO: 207 THOU/UL (ref 140–440)
PMV BLD AUTO: 10.5 FL (ref 7–10)
POTASSIUM BLD-SCNC: 4.3 MMOL/L (ref 3.5–5)
PROT SERPL-MCNC: 6.8 G/DL (ref 6–8)
PSA SERPL-MCNC: 3 NG/ML (ref 0–6.5)
RBC # BLD AUTO: 5.22 MILL/UL (ref 4.4–6.2)
SODIUM SERPL-SCNC: 143 MMOL/L (ref 136–145)
TRIGL SERPL-MCNC: 99 MG/DL
TSH SERPL DL<=0.005 MIU/L-ACNC: 1.66 UIU/ML (ref 0.3–5)
VIT B12 SERPL-MCNC: 558 PG/ML (ref 213–816)
WBC: 9.3 THOU/UL (ref 4–11)

## 2021-05-24 RX ORDER — AMOXICILLIN 500 MG/1
2000 CAPSULE ORAL
Qty: 16 CAPSULE | Refills: 2 | Status: SHIPPED | OUTPATIENT
Start: 2021-05-24

## 2021-05-24 ASSESSMENT — PATIENT HEALTH QUESTIONNAIRE - PHQ9: SUM OF ALL RESPONSES TO PHQ QUESTIONS 1-9: 9

## 2021-05-27 ASSESSMENT — PATIENT HEALTH QUESTIONNAIRE - PHQ9: SUM OF ALL RESPONSES TO PHQ QUESTIONS 1-9: 11

## 2021-05-29 NOTE — PROGRESS NOTES
The patient was provided with suggestions to help him develop a healthy physical lifestyle.   He is at risk for lack of exercise and has been provided with information to increase physical activity for the benefit of his well-being.  The patient was counseled and encouraged to consider modifying their diet and eating habits. He was provided with information on recommended healthy diet options.  The patient was provided with written information regarding signs of hearing loss.  He is at risk for falling and has been provided with information to reduce the risk of falling at home.  Patient's advanced directive was discussed and I am comfortable with the patient's wishes.

## 2021-05-31 VITALS — HEIGHT: 71 IN | BODY MASS INDEX: 35.06 KG/M2 | WEIGHT: 250.4 LBS

## 2021-06-01 VITALS — HEIGHT: 69 IN | WEIGHT: 238.8 LBS | BODY MASS INDEX: 35.37 KG/M2

## 2021-06-01 NOTE — TELEPHONE ENCOUNTER
EMAIL THREAD WITH WIFE.    Thank you for the update.    I am sorry to hear of this change in events.  I hope that the next few days go better for him.    I will be happy to see him back in follow up after this hospital stay.    [facility] is generally poor at sending me records.  I have not received anything as of yet.  Their staff will insist that I will be able to get at his records through the computer, but I tried doing so and [facility] has me locked out from accessing his records.    Please encourage them to send me records.  My fax number is [phone].  Otherwise, I can get them at his follow up visit.    If there is anything I can do to help otherwise, please let me know.    Best wishes,    Aiden Chen MD    -----Original Message-----  From: Olivia <[email or web address]>   Sent: Saturday, September 7, 2019 7:05 PM  To: Juan Francisco Chen <[email or web address]>  Subject: Gage Mike 1950, [facility] Dr Chen,  I m not sure if you will be notified, so I wanted to tell you that Gage had a stroke at our house last night.  He was taken by ambulance to [place] where they administered the clot busting drug, then on to [facility] in [facility].  The neurosurgeon removed calcification from his artery and Gage is now in the Stroke ICU.  His speech is pretty good, he can swallow, and mobility on his left side is starting to improve.  I imagine it will take awhile to get his strength back.    I thought it would be safest to let you know about this so you can be watching for the records from [facility].    Hope you are well!    Olivia Garcia    Sent from my iPhone

## 2021-06-02 ENCOUNTER — COMMUNICATION - HEALTHEAST (OUTPATIENT)
Dept: INTERNAL MEDICINE | Facility: CLINIC | Age: 71
End: 2021-06-02

## 2021-06-02 DIAGNOSIS — R26.81 GAIT INSTABILITY: ICD-10-CM

## 2021-06-02 DIAGNOSIS — I69.359 HEMIPLEGIA AS LATE EFFECT OF CEREBROVASCULAR ACCIDENT (CVA) (H): ICD-10-CM

## 2021-06-02 DIAGNOSIS — I63.411 CEREBROVASCULAR ACCIDENT (CVA) DUE TO EMBOLISM OF RIGHT MIDDLE CEREBRAL ARTERY (H): ICD-10-CM

## 2021-06-02 DIAGNOSIS — R29.898 LEFT LEG WEAKNESS: ICD-10-CM

## 2021-06-02 NOTE — TELEPHONE ENCOUNTER
UPDATED THREAD ON THE PATIENT:    Thank you for the update.    I am so sorry to hear his recovery has not been quicker, but I anticipate he will continue to make progress.    I am sure that this has been quite challenging for both of you.    Please let me know when we need to get him back in.    Aiden Chen    From: Oliviadiamante Garcia <[email or web address]>   Sent: Thursday, October 3, 2019 9:08 PM  To: Juan Francisco Chen <[email or web address]>  Subject: Re: Gage Garcia 1950, [facility] Dr. Chen,    Here is an update on Gage.  He was at [facility] for 26 days:  2 in ICU, 3 on the stroke alvarez and 3 weeks in their Acute Rehab section.  His therapy was intensive and he has made progress, albeit slow.  He's lost over 20 pounds so far, has really improved his core strength, but his left leg, and especially arm, are slow to come back.  He is now in the Transitional Care Unit at [facility] in [place], where he will get additional therapy for several weeks.  We still hope to get back to [place] later this fall, but will wait until Gage is able to travel safely.      Medically, Gage is stable, now taking Warfarin (4 mg), amLODIPine (10 mg), 81 mg aspirin, atorvastatin (80 mg). [facility] recommends scheduling a visit with you two weeks after discharge from [facility].   He will also need to consult with [facility] re his aortic stenosis.      I inquired at [facility] as to why you were unable to access Gage's records.  They gave me a form to fill out that authorizes them to fax you Gage's records upon his discharge (which was 10/2).  I hope you get them.    We'll see you later in the fall!    Olivia Mike        On Sunday, September 8, 2019, 07:27:56 PM CDTGustavo Joseph C <[email or web address]> wrote:       Thank you for the update.    I am sorry to hear of this change in events.  I hope that the next few days go better for him.    I will be happy to see him back in follow up after this hospital  stay.    [facility] is generally poor at sending me records.  I have not received anything as of yet.  Their staff will insist that I will be able to get at his records through the computer, but I tried doing so and [facility] has me locked out from accessing his records.    Please encourage them to send me records.  My fax number is [phone].  Otherwise, I can get them at his follow up visit.    If there is anything I can do to help otherwise, please let me know.    Best wishes,    Aiden Chen MD    -----Original Message-----  From: Olivia <[email or web address]   Sent: Saturday, September 7, 2019 7:05 PM  To: Juan Francisco Chen <[email or web address]>  Subject: Gage Garcia 1950, [facility] Dr Chen,  I m not sure if you will be notified, so I wanted to tell you that Gage had a stroke at our house last night.  He was taken by ambulance to [place] where they administered the clot busting drug, then on to [facility] in [facility].  The neurosurgeon removed calcification from his artery and Gage is now in the Stroke ICU.  His speech is pretty good, he can swallow, and mobility on his left side is starting to improve.  I imagine it will take awhile to get his strength back.    I thought it would be safest to let you know about this so you can be watching for the records from [facility].    Hope you are well!    Olivia Garcia

## 2021-06-03 VITALS — HEIGHT: 69 IN | BODY MASS INDEX: 35.5 KG/M2 | WEIGHT: 239.7 LBS

## 2021-06-03 NOTE — TELEPHONE ENCOUNTER
Please cancel November appointment with me and change his appointment to    Monday Dec 9th at 2:30 pm.    REASON:  Follow up stroke.    No need to call patient.  Thank you.    Juan Francisco Chen MD  General Internal Medicine  [facility]  11/19/2019, 9:19 PM

## 2021-06-03 NOTE — TELEPHONE ENCOUNTER
Sent an email message in relationship to this and anticipate a response.    Juan Francisco Chen MD  General Internal Medicine  [facility]  11/18/2019, 4:57 PM

## 2021-06-03 NOTE — TELEPHONE ENCOUNTER
EMAIL THREAD WITH patient's wife:    We will change your appointment to Monday Dec 9th at 2:30 pm then.    Thank you and best wishes,    Aiden Chen MD    From: Olivia <[email or web address]   Sent: Monday, November 18, 2019 6:13 PM  To: Juan Francisco Chen <[email or web address]>  Subject: Re: Gage's appointment    Helbrandin Chen,    Thanks for your email.  I m at [facility] s Transitional Care Unit most of the time with Gage, so the best way to contact us by phone is my cell:  [phone].  Could you please put that as the primary contact number for Gage?  He isn t answering his cell phone yet.    We would be happy to reschedule his appointment.   I think Monday, Dec. 9 would be the best because of his ongoing therapy.    Gage has been at [facility] since Oct. 2.  His therapy continues, but it s slow going.  His left leg is strengthening, but not much movement yet with his left arm.  Gage is also dealing with some pain and sleep issues.      I took Gage to the [facility] in early November to assess his aortic stenosis and they want to replace his valve on January 9.  We return there on Wednesday to consult with a neurologist.      I do think a longer appointment is a good idea.  Gage is on many new meds now and you have a lot to discuss with him.   We re into the 11th week of his stroke recovery... it s been life-changing, for both of us.  See you in December!    Olivia Garcia  [phone]  Sent from my ACEhone    On Nov 18, 2019, at 4:56 PM, Juan Francisco Chen <[email or web address]> wrote:  ?   I did have someone from our office try to contact you.  I wanted to try to try to reschedule Gage s appointment with me if we could.  Here is the message:        He is scheduled with me on 11/29 and I would like to reschedule him for a time where I could spend more time with him to review all of the recent issues.     I know that he is going for further work-up through the [facility] and this takes priority at this time and we can go  from there.     Some options:     1.  Monday Nov 25th at 2:30 pm  2.  Thursday Dec 5th at 11 am.  3.  Monday Dec 9th at 2:30 pm.     Thanks for accommodating us.  We do want to make sure we cover things thoroughly.     Juan Francisco Chen MD  [facility]  11/12/2019, 11:19 PM       Please let me know your thoughts  Thanks.     LIAM

## 2021-06-03 NOTE — TELEPHONE ENCOUNTER
Please call patient or wife -    ______________________________________________________________________     Home phone:  [phone] (home)     Cell phone:   Telephone Information:   Mobile [phone]       Other contacts:  Name Home Phone Work Phone Mobile Phone Relationship Lgl VERONA Ireland [phone]   Spouse      ______________________________________________________________________     He is scheduled with me on 11/29 and I would like to reschedule him for a time where I could spend more time with him to review all of the recent issues.    I know that he is going for further work-up through the [facility] and this takes priority at this time and we can go from there.    Some options:    1.  Monday Nov 25th at 2:30 pm  2.  Thursday Dec 5th at 11 am.  3.  Monday Dec 9th at 1:30 pm.    Thanks for accommodating us.  We do want to make sure we cover things thoroughly.    Juan Francisco Chen MD  [facility]  11/12/2019, 11:19 PM

## 2021-06-04 VITALS — OXYGEN SATURATION: 95 % | SYSTOLIC BLOOD PRESSURE: 122 MMHG | HEART RATE: 101 BPM | DIASTOLIC BLOOD PRESSURE: 64 MMHG

## 2021-06-04 VITALS
DIASTOLIC BLOOD PRESSURE: 66 MMHG | OXYGEN SATURATION: 95 % | WEIGHT: 179 LBS | SYSTOLIC BLOOD PRESSURE: 118 MMHG | BODY MASS INDEX: 25.62 KG/M2 | HEART RATE: 80 BPM | HEIGHT: 70 IN

## 2021-06-04 VITALS — HEART RATE: 72 BPM | DIASTOLIC BLOOD PRESSURE: 72 MMHG | SYSTOLIC BLOOD PRESSURE: 118 MMHG | OXYGEN SATURATION: 95 %

## 2021-06-04 NOTE — PATIENT INSTRUCTIONS - HE
Please follow up if you have any further issues.    You may contact me by phone or [employer] if you are worsening or if things are not improving.    Thank you for coming in today.

## 2021-06-07 NOTE — TELEPHONE ENCOUNTER
Symptom  Describe your symptoms: Ana Fowler RN, reported the patient complains of no bowel movement for three days now but is passing gas.    Caller stated patient complains of dizziness the mornings and his blood pressure was 85/58 today.  Any pain: caller did not state  New/Ongoing: New  How long have you been having symptoms: 3  day(s)  Have you been seen for this:  No  Appointment offered?: home care calling  Triage offered?: No, caller not with patient  Home remedies tried: Prune juice, Miralax, senna, fiber, increased water in-take  Requested Pharmacy: n/a  Okay to leave a detailed message? No  Please call Olivia, patient's wife, with any recommendations.

## 2021-06-07 NOTE — TELEPHONE ENCOUNTER
Called to wife - per the home therapist [facility] is not open at this time and is requesting [facility] due to this - just to get him started.  Can change over once [facility] is open again.  Was very thankful that you thought of this and happy for the call.

## 2021-06-07 NOTE — TELEPHONE ENCOUNTER
Received INR result from home care nurse.  Pt is not currently ACM patient.  Per chart review pt recently home from TCU after hospital stay at [facility]    PCP, please enter referral if you want ACM clinic to manage patient otherwise, please address INR below.

## 2021-06-07 NOTE — TELEPHONE ENCOUNTER
INR result is    2.2    Will the patient be seen, or did they already see, MD or CNP today? No    Most Recent Warfarin dose day/week  Sunday Monday Tuesday Wednesday Thursday Friday Saturday    5 5 5 5 5 5     Sunday Monday Tuesday Wednesday Thursday Friday Saturday 5             Has the patient missed any doses of Coumadin, Warfarin, Jantoven in the past 7 days? No    Has the patients medications changed since the last visit? No    Has the patient experienced any bleeding recently? No    Has the patient experienced any injuries or illness recently? No    Has the patient experienced any 'new' shortness of breath, severe headaches, or changes in vision recently? No    Has the patient had any changes in their diet, or alcohol consumption? No    Is the patient here today to prepare for any type of upcoming surgery, procedure, or for a cardioversion procedure? No    What phone number can we reach the patient at today? [phone]

## 2021-06-07 NOTE — TELEPHONE ENCOUNTER
Reviewed and noted.    Juan Francisco Chen MD  General Internal Medicine  [facility]  4/1/2020, 3:39 PM

## 2021-06-07 NOTE — TELEPHONE ENCOUNTER
INR result is No results found for: INR    3/23/20              2.3       Will the patient be seen, or did they already see, MD or CNP today? No    Most Recent Warfarin dose day/week  Sunday Monday Tuesday Wednesday Thursday Friday Saturday    5 mg  5 mg  5 mg  5 mg  5 mg  5 mg      Sunday Monday Tuesday Wednesday Thursday Friday Saturday   5 mg               Has the patient missed any doses of Coumadin, Warfarin, Jantoven in the past 7 days? No    Has the patients medications changed since the last visit? No    Has the patient experienced any bleeding recently? No    Has the patient experienced any injuries or illness recently? No    Has the patient experienced any 'new' shortness of breath, severe headaches, or changes in vision recently? No    Has the patient had any changes in their diet, or alcohol consumption? No    Is the patient here today to prepare for any type of upcoming surgery, procedure, or for a cardioversion procedure? No    What phone number can we reach the patient at today? [facility].  [phone]

## 2021-06-07 NOTE — PATIENT INSTRUCTIONS - HE
Please follow up if you have any further issues.    You may contact me by phone or P[employer]br[employer] if you are worsening or if things are not improving.    Thank you for coming in today.

## 2021-06-07 NOTE — TELEPHONE ENCOUNTER
Called and relayed message to pt .      Will give 1/2 half metoprolol and monitor BP 3x per day to see if there is a pattern.  Will call back if issues.        BM:    Cut back on the senna per RN recommendation - drinking a lot of water and other hot liquids.  Feels like he needs to go but just can not get it out.      Will continue to monitor for additional 24 hours and call if televisit is needed.  Thinks that he needs to drink more liquids.      No diarrhea - was normal and suddenly nothing.

## 2021-06-07 NOTE — TELEPHONE ENCOUNTER
Call patient:  I recommend that he decrease his dose of metoprolol from 25 mg to 12.5 mg. They will need to split the pill in half. They should continue to monitor home blood pressure readings as this needs to be well controlled with his medical history.     Regarding his bowels, were his BMs for 10 days loose? It may be helpful for him to be contacted for a telephone visit to address the bowel issue to be sure that the information is clear before making a suggestion. Please schedule.

## 2021-06-07 NOTE — TELEPHONE ENCOUNTER
"Called and spoke with patient's spouse with patient present.    1.  She reports that patient had been having a moderately soft bowel movement daily X 10 days.  He has not had a bowel movement in the last 3 days.    She has cut back on the stool softeners that patient was receiving while in the TCU ( the last 6 months).  She is currently giving him Benefiber, Miralax, prune and prune juice in the morning and Senna 1 tablet in the evening.    She reports patient complained of some abdominal pain X 1 during lunch today.    2.  Patient's blood pressure was 85/58 taken by home care nurse using a wrist blood pressure monitor, right wrist, sitting.    Patient reports lightheadedness in the mornings only.    Patient's is requesting decrease in metoprolol tartrate (LOPRESSOR) 25 MG tablet dosing.    Patient's spouse was advised on home cares including fall prevention and hydration.   Patient's spouse to call the clinic or seek medical assistance if the symptoms persist or worsen.    Patient verbalized understanding and is agreeable with the plan of care.    Reason for Disposition    Patient wants to be seen    Mild constipation    Brief dizziness or lightheadedness after standing up or eating    Systolic BP  while taking blood pressure medications and NOT dizzy, lightheaded or weak    Answer Assessment - Initial Assessment Questions  1. STOOL PATTERN OR FREQUENCY: \"How often do you pass bowel movements (BMs)?\"  (Normal range: tid to q 3 days)  \"When was the last BM passed?\"        Patient had been having a bowel movement daily X 10days.  Has not had a bowel movement X 3 days today  2. STRAINING: \"Do you have to strain to have a BM?\"       No  3. RECTAL PAIN: \"Does your rectum hurt when the stool comes out?\" If so, ask: \"Do you have hemorrhoids? How bad is the pain?\"  (Scale 1-10; or mild, moderate, severe)      No  4. STOOL COMPOSITION: \"Are the stools hard?\"       Moderate soft with use of the stool softeners  5. " "BLOOD ON STOOLS: \"Has there been any blood on the toilet tissue or on the surface of the BM?\" If so, ask: \"When was the last time?\"       No  6. CHRONIC CONSTIPATION: \"Is this a new problem for you?\"  If no, ask: How long have you had this problem?\" (days, weeks, months)       No  7. CHANGES IN DIET: \"Have there been any recent changes in your diet?\"       Was getting stool softeners regularly in the TCU  8. MEDICATIONS: \"Have you been taking any new medications?\"      Stopped taking the stool softeners  9. LAXATIVES: \"Have you been using any laxatives or enemas?\"  If yes, ask \"What, how often, and when was the last time?\"      Senna-1 tablet at bedtime  Benefiber and Miralax in the morning  10. CAUSE: \"What do you think is causing the constipation?\"         unknown  11. OTHER SYMPTOMS: \"Do you have any other symptoms?\" (e.g., abdominal pain, fever, vomiting)        Complained of some abdominal pain X 1 today during lunch    Answer Assessment - Initial Assessment Questions  1. BLOOD PRESSURE: \"What is the blood pressure?\" \"Did you take at least two measurements 5 minutes apart?\"      85/58 ( home care nurse)  2. ONSET: \"When did you take your blood pressure?\"      Since returning home from [facility]  3. HOW: \"How did you obtain the blood pressure?\" (e.g., visiting nurse, automatic home BP monitor)     Right wrist, sitting  4. HISTORY: \"Do you have a history of low blood pressure?\" \"What is your blood pressure normally?\"      No  5. MEDICATIONS: \"Are you taking any medications for blood pressure?\" If yes: \"Have they been changed recently?\"      metoprolol tartrate (LOPRESSOR) 25 MG tablet: 1 tablet 2 times a day.  6. PULSE RATE: \"Do you know what your pulse rate is?\"         7. OTHER SYMPTOMS: \"Have you been sick recently?\" \"Have you had a recent injury?\"      Lightheaded in the morning only    Protocols used: CONSTIPATION-A-OH, LOW BLOOD PRESSURE-A-OH    "

## 2021-06-07 NOTE — TELEPHONE ENCOUNTER
Who is calling:  Physical Therapy/[facility]  Reason for Call:  Wants to inform PCP of a couple of missed appointments for Physical therapy, once today 4/1 and this upcoming Friday 4/6, due to covid precautions  Date of last appointment with primary care: N/A  Okay to leave a detailed message: No

## 2021-06-07 NOTE — TELEPHONE ENCOUNTER
INR result is   INR   Date Value Ref Range Status   04/13/2020 2.20 (!) 0.9 - 1.1 Final     4/27/20                          2.1    Will the patient be seen, or did they already see, MD or CNP today? No    Most Recent Warfarin dose day/week  Sunday Monday Tuesday Wednesday Thursday Friday Saturday     5 mg  5 mg  5 mg  5 mg  5 mg  5 mg     Sunday Monday Tuesday Wednesday Thursday Friday Saturday    5 mg             Has the patient missed any doses of Coumadin, Warfarin, Jantoven in the past 7 days? No    Has the patients medications changed since the last visit? No    Has the patient experienced any bleeding recently? No    Has the patient experienced any injuries or illness recently? No    Has the patient experienced any 'new' shortness of breath, severe headaches, or changes in vision recently? No    Has the patient had any changes in their diet, or alcohol consumption? No    Is the patient here today to prepare for any type of upcoming surgery, procedure, or for a cardioversion procedure? No    What phone number can we reach the patient at today? home phone [facility]  [phone]

## 2021-06-07 NOTE — TELEPHONE ENCOUNTER
INR result is 2.0  INR   Date Value Ref Range Status   03/30/2020 2.30 (!) 0.9 - 1.1 Final       Will the patient be seen, or did they already see, MD or CNP today? No    Most Recent Warfarin dose day/week  Sunday Monday Tuesday Wednesday Thursday Friday Saturday    5 5 5 5 5 5     Sunday Monday Tuesday Wednesday Thursday Friday Saturday   5             Has the patient missed any doses of Coumadin, Warfarin, Jantoven in the past 7 days? No    Has the patients medications changed since the last visit? No    Has the patient experienced any bleeding recently? No    Has the patient experienced any injuries or illness recently? No    Has the patient experienced any 'new' shortness of breath, severe headaches, or changes in vision recently? No    Has the patient had any changes in their diet, or alcohol consumption? No    Is the patient here today to prepare for any type of upcoming surgery, procedure, or for a cardioversion procedure? No    What phone number can we reach the patient at today? [facility] [phone]

## 2021-06-07 NOTE — TELEPHONE ENCOUNTER
ANTICOAGULATION  MANAGEMENT- Home Care/Care Facility Result    Assessment     Today's INR result of 2.1 is Therapeutic (goal INR of 2.0-3.0)        Warfarin taken as previously instructed    No new diet changes affecting INR    No new medication/supplements affecting INR    Continues to tolerate warfarin with no reported s/s of bleeding or thromboembolism     Previous INR was Therapeutic    Plan:     Spoke with home care nurse Janeth discussed INR result and instructed:     Warfarin Dosing Instructions: Continue current warfarin dose 5 mg daily    Next INR to be drawn: two weeks with home care      Janeth verbalizes understanding and agrees to warfarin dosing plan.   ?   Matilde Briceno RN    Subjective/Objective:      Aguilar Garcia, a 70 y.o. male is established on warfarin.     Home care/care facility RN's report of [place] INR, recent warfarin dosing, diet changes, medication changes, and symptoms is documented below.    Additional findings: none    Anticoagulation Episode Summary     Current INR goal:   2.0-3.0   TTR:   100.0 % (3.6 wk)   Next INR check:   5/11/2020   INR from last check:   2.10 (4/27/2020)   Weekly max warfarin dose:      Target end date:   7/15/2020   INR check location:      Preferred lab:      Send INR reminders to:   [facility]    Indications    S/P aortic valve replacement with bioprosthetic valve [Z95.3]           Comments:            Anticoagulation Care Providers     Provider Role Specialty Phone number    Juan Francisco Chen MD Referring Internal Medicine [phone]

## 2021-06-07 NOTE — TELEPHONE ENCOUNTER
How frustrating.  I surely feel that the insurance could let go of some of their mahoney issues during the current crisis.    I reviewed Gage s chart and so far I have not received any messaging related to this.    I cannot understand what the issue is.    For example, if you paid out of pocket at [facility] for a 90 day prescription of this medication (fluoxetine), it should only cost $10.    Seems like too much bureaucracy for such an inexpensive medication.    I am going to send the medication to [facility] in [place] and you may try paying for outside of your insurance if they quote you a higher price.    Cancel the prescription through [facility].    Aiden Chen    From: Olivia Garcia <[email or web address]   Sent: Wednesday, April 1, 2020 8:54 PM  To: Juan Francisco Chen <[email or web address]>  Subject: Gage Garcia 1950 anti-depressant issue    Hi Gage Diane and I were in to see you a week ago, and you sent in four prescription orders to [facility] mail order pharmacy for 90 day supplies.    One of those, Fluoxetine 20 mg, is apparently held up.  I'm copying the message I got from [facility] about the Fluoxetine at the end of this email.    I wonder if [facility] has been able to reach you to resolve the problem with dispensing the Fluoxetine.  I only have 2 weeks more of the Sertraline, as well as the other medications, so I'm hoping the issue can be taken care of soon.    Please contact me if there's anything I should be doing.  Thank you for checking!    Olivia Garcia  [address] [place]  [address]    [phone]

## 2021-06-07 NOTE — TELEPHONE ENCOUNTER
Please call wife in relationship to this please.    Please confirm they want to do this through [facility] physical therapy.    I have had very good success with [facility] physical therapy if they would rather do this instead in [place].    Please let me know and then I can send the referral.    Juan Francisco Chen MD  General Internal Medicine  [facility]  4/28/2020, 1:11 PM

## 2021-06-07 NOTE — TELEPHONE ENCOUNTER
Will place referral. Anticoagulation to continue through mid July.    Juan Francisco Chen MD  General Internal Medicine  [facility]  3/23/2020, 3:26 PM

## 2021-06-07 NOTE — TELEPHONE ENCOUNTER
Referral Request  Type of referral: Out patient PT/OT  Who s requesting: Homecare: Julita  Why the request: Need continued out patient service for dx of stroke  Have you been seen for this request: Yes  Does patient have a preference on a group/provider? [facility] fax # [phone]  Okay to leave a detailed message?  Yes

## 2021-06-07 NOTE — TELEPHONE ENCOUNTER
INR result is 2.3  INR   Date Value Ref Range Status   03/23/2020 2.30 (!) 0.9 - 1.1 Final       Will the patient be seen, or did they already see, MD or CNP today? No    Most Recent Warfarin dose day/week  Sunday Monday Tuesday Wednesday Thursday Friday Saturday    5 5 5 5 5 5     Sunday Monday Tuesday Wednesday Thursday Friday Saturday   5             Has the patient missed any doses of Coumadin, Warfarin, Jantoven in the past 7 days? No    Has the patients medications changed since the last visit? No    Has the patient experienced any bleeding recently? No    Has the patient experienced any injuries or illness recently? No    Has the patient experienced any 'new' shortness of breath, severe headaches, or changes in vision recently? No    Has the patient had any changes in their diet, or alcohol consumption? No    Is the patient here today to prepare for any type of upcoming surgery, procedure, or for a cardioversion procedure? No    What phone number can we reach the patient at today? home phone listed in demographics.

## 2021-06-07 NOTE — TELEPHONE ENCOUNTER
Order done and faxed.    Juan Francisco Chen MD  General Internal Medicine  [facility]  4/28/2020, 4:51 PM

## 2021-06-07 NOTE — TELEPHONE ENCOUNTER
COVID-19 Visitor Wellness Screening Tool:    Do you have a:    Fever?  no  Cough? no  Shortness of breath? no  Skin rash? no    Within the past 14 days, have you been in contact with someone who:    Is currently being ruled out for COVID- 19 (novel coronavirus)? no  Has tested positive for COVID-19? no  Has symptoms of respiratory illness (fever, cough, shortness of breath)?no    Have you recently traveled to an area with COVID-19 cases? no  Refer to CDC Coronavirus webpage for COVID-19 areas: (www.cdc.gov/coronavirus/2019-ncov)    Within the past 3 weeks, have you been exposed to the following?  Pertussis no  Chickenpox no  Measles no      If patient answers YES, they should be directed to [facility] for further evaluation and IN-CLINIC visit canceled.  If patient answers NO, they can remain scheduled for an IN-CLINIC visit.    Are you bringing a visitor with you? Wife, wife has same answers as above.  If so there can only be one person with you if needed (if person is with them please ask the same questions).  Wife has same answers as above.    Pt will be asked the same questions when they come into clinic for their appointment.

## 2021-06-07 NOTE — TELEPHONE ENCOUNTER
ANTICOAGULATION  MANAGEMENT- Home Care/Care Facility Result    Assessment     Today's INR result of 2.2 is Therapeutic (goal INR of 2.0-3.0)        Warfarin taken as previously instructed    No new diet changes affecting INR    No new medication/supplements affecting INR    Continues to tolerate warfarin with no reported s/s of bleeding or thromboembolism     Previous INR was Therapeutic      Patient is requesting for warfarin 5 mg refill sent to [facility] pharmacy.    Plan:     Spoke with Home Care Nurse Janeth discussed INR result and instructed:     Warfarin Dosing Instructions: Continue current warfarin dose    5 mg every day        (0 % change)    Next INR to be drawn: 2 weeks.    Education provided: importance of therapeutic range and target INR goal and significance of current INR result    Janeth verbalizes understanding and agrees to warfarin dosing plan.   ?   Daniela Candelaria RN    Subjective/Objective:      Aguilar Garcia, a 70 y.o. male is established on warfarin.     Home care/care facility RN's report of Aguilar INR, recent warfarin dosing, diet changes, medication changes, and symptoms is documented below.    Additional findings: none    Anticoagulation Episode Summary     Current INR goal:   2.0-3.0   TTR:   100.0 % (1.6 wk)   Next INR check:   4/27/2020   INR from last check:   2.20 (4/13/2020)   Weekly max warfarin dose:      Target end date:   7/15/2020   INR check location:      Preferred lab:      Send INR reminders to:   [facility]    Indications    S/P aortic valve replacement with bioprosthetic valve [Z95.3]           Comments:            Anticoagulation Care Providers     Provider Role Specialty Phone number    Juan Francisco Chen MD Referring Internal Medicine [phone]

## 2021-06-08 NOTE — TELEPHONE ENCOUNTER
Please call wife back.    I believe that he is supposed to be on warfarin through 7/9/2020 as this would be 6 months out from his valve replacement.    This is a rather long time to go out without monitoring it.  I think it would be reasonable given the results to monitor every month at this time or every 3 weeks.  This would mean that he would have blood work drawn 2 more times likely.    The main reason for warfarin is to prevent further stroke which is the main reason we want to keep it monitored.    If they refuse to do it, that is their decision and it may go fine.  Another option would be to send them in order in the mail for blood work orders to be done either through [facility] or through [facility] and then faxed to us.  Please see if they be willing to do this since her already going to [facility] for his physical therapy.    Thank you,    Juan Francisco Chen MD  General Internal Medicine  [facility]  5/19/2020, 11:16 PM

## 2021-06-08 NOTE — TELEPHONE ENCOUNTER
Spoke with pt's wife, Olivia.  She reports pt is to stop warfarin on 6/9/2020 as that is 6 months from his surgery date.    Last INR was on 4/27 and was therapeutic, INR's have been therapeutic since March when pt was enrolled into our program.  Wife states it is difficult to get pt into the clinic, inquiring if pt needs another INR prior to stopping the warfarin.    PCP/Covering provider, please advise if you are okay with pt not getting an INR prior to stopping warfarin.  Also if okay to stop warfarin on 6/9.

## 2021-06-08 NOTE — TELEPHONE ENCOUNTER
ANTICOAGULATION  MANAGEMENT    Assessment     Today's INR result of 2.0 is Therapeutic (goal INR of 2.0-3.0)        Warfarin taken as previously instructed    No new diet changes affecting INR    No new medication/supplements affecting INR    Continues to tolerate warfarin with no reported s/s of bleeding or thromboembolism     Previous INR was Therapeutic     6 months of warfarin post avr jan 9, jul 15 is projected dc date    Plan:     Spoke with wife Olivia regarding INR result and instructed:     Warfarin Dosing Instructions:  Continue current warfarin dose 5 mg daily    Instructed patient to follow up no later than: one month    Olivia verbalizes understanding and agrees to warfarin dosing plan.    Instructed to call the [facility] for any changes, questions or concerns. (#[phone])   ?   Matilde Briceno RN    Subjective/Objective:      Aguilar Garcia, a 70 y.o. male is on warfarin.     Aguilar reports:     Home warfarin dose: as updated on anticoagulation calendar per template     Missed doses: No     Medication changes:  No     S/S of bleeding or thromboembolism:  No     New Injury or illness:  No     Changes in diet or alcohol consumption:  No     Upcoming surgery, procedure or cardioversion:  No    Anticoagulation Episode Summary     Current INR goal:   2.0-3.0   TTR:   100.0 % (1.8 mo)   Next INR check:   6/23/2020   INR from last check:   2.00 (5/26/2020)   Weekly max warfarin dose:      Target end date:   7/15/2020   INR check location:      Preferred lab:      Send INR reminders to:   [facility]    Indications    S/P aortic valve replacement with bioprosthetic valve [Z95.3]           Comments:            Anticoagulation Care Providers     Provider Role Specialty Phone number    Juan Francisco Chen MD Referring Internal Medicine [phone]

## 2021-06-08 NOTE — TELEPHONE ENCOUNTER
Spoke with Olivia. They will check INR while at therapy appts.  INR order was printed and mailed to pt.  No further questions at this time.

## 2021-06-08 NOTE — TELEPHONE ENCOUNTER
EMAIL THREAD WITH THE patient's wife:    I am going to send in a low dose of Ritalin to see if this helps.  Please let me know from there.    LIAM    From: Olivia <[email or web address]   Sent: Wednesday, May 20, 2020 12:20 AM  To: Juan Francisco Chen <[email or web address]  Subject: Re: Gage Mccordlaura 1950    If you think there s a chance it would help, I think it would be worth a try.    I ve also been in contact with Khloe from the INR department.  Gage will be able to stop taking the blood thinner on June 9, six months after his surgery.  Khloe was going to check to see if it s OK to leave Gage on the 5 mg dose until then without doing another INR since he s been consistent for over 2 months.  Home care has stopped, so I would need to bring him to the clinic.    I ll wait to hear what your recommendation is.    Thank you!    Olivia        Sent from my iPhone    On May 19, 2020, at 11:08 PM, Juan Francisco Chen ([employer]) <[email or web address]> wrote:  ?   Sometimes adding a low dose of a stimulant like Ritalin or otherwise can help with depressive symptoms along with the fluoxetine.     What would you think about adding something like this to see if this helps?     JCA     From: Olivia Garcia <[email or web address]   Sent: Friday, May 15, 2020 11:21 PM  To: Juan Francisco Chen <[email or web address]>  Subject: Gage Mike 1950     Hi Gage Diane and I saw you the end of March, about 7 weeks ago.  Among other things, we discussed his depression since the September stroke and January aortic valve replacement.      At that time, Gage had been taking sertraline for a couple months, and it hadn't really elevated his mood and motivation.     You suggested switching to 50 mg daily of fluoxetine, which he's been taking now for more than 6 weeks.  He and I both agree it hasn't helped.  Gage is still depressed, and although he goes to outpatient therapy twice a week, is totally unmotivated here at home  when I try to get him to continue the exercises they recommend.  He spends alot of time in bed and shows very little interest in anything except reading.       I'm wondering if you think we should up the dosage of the fluoxetine, or should Win just give it up.  I don't know if there's anything else we could try.      I hate to bother you with this, but I'm so frustrated and desperately looking for answers.  I tried to get Win's and my wellness appointments with you in June, but the earliest they could schedule us was July 31st, and I didn't want to wait until then to address this issue.     Thank you for giving this a little thought when you have a chance.        Olivia Garcia     [phone] [address] [address] [facility] [place] [address]

## 2021-06-08 NOTE — TELEPHONE ENCOUNTER
ANTICOAGULATION  MANAGEMENT PROGRAM    Aguilar Garcia is overdue for INR check.     Left message to call and schedule INR appointment as soon as possible.      Khloe Julian, RN

## 2021-06-08 NOTE — TELEPHONE ENCOUNTER
FYI - Status Update  Who is Calling: Wife, Olivia  Update: Returned [facility] Khloe's call to schedule INR, but does wish to speak with you. Patient was having INR drawn by Home Health nurses at their visits, but visits stopped when he started doing therapy service. Also patient is scheduled to go off anticoagulation starting 6/09/20. Wife requests call back to discuss anticoagulation/INRs.  Okay to leave a detailed message?:  Yes

## 2021-06-09 NOTE — TELEPHONE ENCOUNTER
ANTICOAGULATION  MANAGEMENT    Assessment     Today's INR result of 1.8 is Subtherapeutic (goal INR of 2.0-3.0)        Warfarin taken as previously instructed    No new diet changes affecting INR    No new medication/supplements affecting INR    Continues to tolerate warfarin with no reported s/s of bleeding or thromboembolism     Previous INR was Therapeutic    Plan:     Spoke with wife Olivia regarding INR result and instructed:     Warfarin Dosing Instructions:  Take booster dose of 7.5 mg today only then continue current warfarin dose 5 mg daily.    Instructed patient to follow up no later than:  2 weeks. However Olivia said patient will discontinue warfarin in a little more than two weeks, therefore she will not have him check INR again. Per Denny notes 3/16/20, pt is to be on warfarin 6 months post AVR. AVR was 1/09/20. Warfarin was resumed on 1/11/20. So Olivia will have patient take his last warfarin dose on 7/11/20.     Education provided: importance of taking warfarin as instructed    Olivia verbalizes understanding and agrees to warfarin dosing plan.    Instructed to call the [facility] for any changes, questions or concerns. (#[phone])   ?   Tello Hernandez RN    Subjective/Objective:      Aguilar Garcia, a 70 y.o. male is on warfarin.     Aguilar reports:     Home warfarin dose: verbally confirmed home dose with Olivia and updated on anticoagulation calendar     Missed doses: No     Medication changes:  No     S/S of bleeding or thromboembolism:  No     New Injury or illness:  No     Changes in diet or alcohol consumption:  No     Upcoming surgery, procedure or cardioversion:  No    Anticoagulation Episode Summary     Current INR goal:   2.0-3.0   TTR:   65.9 % (2.7 mo)   Next INR check:   7/7/2020   INR from last check:   1.80! (6/23/2020)   Weekly max warfarin dose:      Target end date:   7/15/2020   INR check location:      Preferred lab:      Send INR reminders to:   [facility]     Indications    S/P aortic valve replacement with bioprosthetic valve [Z95.3]           Comments:            Anticoagulation Care Providers     Provider Role Specialty Phone number    Juan Francisco Chen MD Referring Internal Medicine [phone]

## 2021-06-09 NOTE — TELEPHONE ENCOUNTER
Refill Request  Did you contact pharmacy: No  Medication name: The patient's wife is requesting the refill of Ritalin 5MG two times a day. This medication is not on the patient's medication list.  Requested Prescriptions      No prescriptions requested or ordered in this encounter     Who prescribed the medication: Juan Francisco Chen MD    Requested Pharmacy: [facility]   Is patient out of medication: Yes- The caller states the patient going out of town tomorrow and would like to get the refill today.  Patient notified refills processed in 3 business days:  yes  Okay to leave a detailed message: yes

## 2021-06-09 NOTE — TELEPHONE ENCOUNTER
methylphenidate HCl (RITALIN) 5 MG tablet  60 tablet  0  5/21/2020 6/20/2020  --    Sig - Route: Take 1 tablet (5 mg total) by mouth 2 (two) times a day. Take am and afternoon. - Oral    Sent to pharmacy as: methylphenidate 5 mg tablet (Ritalin)    Earliest Fill Date: 5/21/2020    E-Prescribing Status: Receipt confirmed by pharmacy (5/21/2020  8:12 AM CDT)      3/27/2020 Juan Francisco Chen MD  INF    Teed up - adjust as needed

## 2021-06-11 NOTE — PROGRESS NOTES
Wt Readings from Last 20 Encounters:   06/06/17 (!) 250 lb 6.4 oz (113.6 kg)   06/29/16 (!) 251 lb 12.8 oz (114.2 kg)   06/29/15 (!) 250 lb 9.6 oz (113.7 kg)

## 2021-06-13 NOTE — TELEPHONE ENCOUNTER
Email thread with the patient's wife:    I will send 2 scripts to [facility].    One will be for 2 weeks to start with and then a higher dose after that.    A psychiatrist is an expert in the medications, not the counseling but we will try this first.    Give this 6 weeks to fully kick in.    CIARRAA    -----Original Message-----  From: Olivia Garcia <[email or web address]>   Sent: Thursday, November 12, 2020 9:45 PM  To: Juan Francisco Chen <[email or web address]  Subject: Re: Gage Garcia 1950    Thank you for getting back to me.  I think trying the venlafaxine is the way we should go.  I agree that if Paxil is in the same antidepressant category as the three he s tried,  it may not be effective.   I d appreciate it if you would prescribe a 90 day supply with [facility] mail order.    Gage has had several psychiatric consults since his stroke 14 months ago, both at [facility] and [facility].  I was in on some of them and they weren t productive.  Gage was quite reticent and apathetic.  But I ll ask him if he d be willing to retry.    I m grateful for your help on this!    Olivia Garcia        > On Nov 12, 2020, at 5:53 PM, Juan Francisco Chen ([facility]) <[email or web address]> wrote:  >   > I think the paxil would be safer for him.  I have had patients with seizures on lower doses and the seizure risk is not insignificant with the extent of his stroke.  >   > Another option would be venlafaxine as it is in a different class than what we have used so far.  Paxil is more in the same class as the Zoloft and the Prozac.  It can still work, however.  >   > He could consider seeing a psychiatrist if needed in the future if needed.  >   > Let me know what you would like me to send in.  >   > JCA  >   > -----Original Message-----  > From: Olivia Garcia <[email or web address]  > Sent: Tuesday, November 10, 2020 9:55 PM  > To: Juan Francisco Chen <[email or web address]  > Subject: Gage Garcia 1950  >   > Hi Dr. Chen,  > I m  sorry to keep throwing questions at you, but I m really concerned about Gage.  His physical and occupational therapies have stopped now that we are in [place] for the winter.  There has been very little improvement in his recovery since we saw you July 31.  I ve done some research on stroke after effects, and feel that Gage is, and has been showing classic signs of apathy as well as depression.  His lack of motivation and self-care is of great concern to me.  I m sure you noticed Gage s flat affect   he shows very little interest in the world around him and has no steve in his life.  >   > When we saw you July 31st, we decided to discontinue anti-depressants  since the Zoloft, Prozac and mirtazapine hadn't helped.  Wellbutrin was a possibility, but you mentioned issues with seizures.  In my reading, I ve found that seizures normally aren t an issue unless the dose is over 450 mg/day.  I m wondering if we could try the generic Wellbutrin (bupropion), or possibly Paxil (paroxetine) as an alternative?  >   > Would you consider prescribing either of these for Gage?  I just can t sit and watch without continuing to try to pull him out of the apathy syndrome that he s exhibiting.  >   > Thank you so much for letting me know what you think we can do.  >   > Olivia Garcia  > [phone]

## 2021-06-16 PROBLEM — F32.1 MODERATE MAJOR DEPRESSION (H): Chronic | Status: ACTIVE | Noted: 2020-08-02

## 2021-06-16 PROBLEM — K59.00 CONSTIPATION, UNSPECIFIED CONSTIPATION TYPE: Status: ACTIVE | Noted: 2020-08-02

## 2021-06-16 PROBLEM — N40.0 BPH (BENIGN PROSTATIC HYPERPLASIA): Status: ACTIVE | Noted: 2020-08-02

## 2021-06-16 NOTE — TELEPHONE ENCOUNTER
Telephone Encounter by Khloe Julian RN at 3/23/2020  3:41 PM     Author: Khloe Julian RN Service: -- Author Type: Registered Nurse    Filed: 3/23/2020  3:44 PM Encounter Date: 3/23/2020 Status: Attested    : Khloe Julian RN (Registered Nurse) Cosigner: Juan Francisco Chen MD at 3/23/2020  3:50 PM    Attestation signed by Juan Francisco Chen MD at 3/23/2020  3:50 PM    C                 ANTICOAGULATION  MANAGEMENT- Home Care/Care Facility Result    Assessment     Today's INR result of 2.3 is Therapeutic (goal INR of 2.0-3.0)        Warfarin taken as previously instructed - discharged from TCU to home 1 week ago    No new diet changes affecting INR    No new medication/supplements affecting INR    Continues to tolerate warfarin with no reported s/s of bleeding or thromboembolism     Previous INR was Therapeutic at TCU    Plan:     Spoke with Janeth home care nurse discussed INR result and instructed:     Warfarin Dosing Instructions: Continue current warfarin dose 5 mg daily (0 % change)    Next INR to be drawn: 1 week    Education provided: target INR goal and significance of current INR result    Janeth verbalizes understanding and agrees to warfarin dosing plan.   ?   Khloe Julian RN    Subjective/Objective:      Aguilar Garcia, a 70 y.o. male is established on warfarin.     Home care/care facility RN's report of Aguilar INR, recent warfarin dosing, diet changes, medication changes, and symptoms is documented below.    Additional findings: none    Anticoagulation Episode Summary     Current INR goal:   2.0-3.0   TTR:   --   Next INR check:   3/30/2020   INR from last check:   2.30 (3/23/2020)   Weekly max warfarin dose:      Target end date:   7/15/2020   INR check location:      Preferred lab:      Send INR reminders to:   [facility]    Indications    S/P aortic valve replacement with bioprosthetic valve [Z95.3]           Comments:            Anticoagulation Care Providers      Provider Role Specialty Phone number    Juan Francisco Chen MD Referring Internal Medicine [phone]

## 2021-06-16 NOTE — TELEPHONE ENCOUNTER
Telephone Encounter by Matilde Briceno RN at 3/30/2020  2:52 PM     Author: Matilde Briceno RN Service: -- Author Type: Registered Nurse    Filed: 3/30/2020  3:04 PM Encounter Date: 3/30/2020 Status: Attested    : Matilde Briceno RN (Registered Nurse) Cosigner: Juan Francisco Chen MD at 3/30/2020  3:05 PM    Attestation signed by Juan Francisco Chen MD at 3/30/2020  3:05 PM    Anticoagulation care reviewed.  I agree with the plan of care.    Juan Francisco Chen MD  [facility]  3/30/2020, 3:05 PM                 ANTICOAGULATION  MANAGEMENT- Home Care/Care Facility Result    Assessment     Today's INR result of 2.3 is Therapeutic (goal INR of 2.0-3.0)        Warfarin taken as previously instructed    No new diet changes affecting INR    No new medication/supplements affecting INR    Continues to tolerate warfarin with no reported s/s of bleeding or thromboembolism     Previous INR was Therapeutic    Plan:     Spoke with home care nurse Janeth discussed INR result and instructed:     Warfarin Dosing Instructions: Continue current warfarin dose 5 mg daily  (0 % change)    Next INR to be drawn: one week with home care    Janeth verbalizes understanding and agrees to warfarin dosing plan.   ?   Matilde Briceno RN    Subjective/Objective:      Aguilar Garcia, a 70 y.o. male is established on warfarin.     Home care/care facility RN's report of [place] INR, recent warfarin dosing, diet changes, medication changes, and symptoms is documented below.    Additional findings: none    Anticoagulation Episode Summary     Current INR goal:   2.0-3.0   TTR:   --   Next INR check:   4/6/2020   INR from last check:   2.30 (3/30/2020)   Weekly max warfarin dose:      Target end date:   7/15/2020   INR check location:      Preferred lab:      Send INR reminders to:   [facility]    Indications    S/P aortic valve replacement with bioprosthetic valve [Z95.3]           Comments:             Anticoagulation Care Providers     Provider Role Specialty Phone number    Juan Francisco Chen MD Referring Internal Medicine [phone]

## 2021-06-17 NOTE — TELEPHONE ENCOUNTER
Telephone Encounter by Tello Hernandez RN at 4/6/2020  1:46 PM     Author: Tello Hernandez RN Service: -- Author Type: RN, Care Manager    Filed: 4/6/2020  1:47 PM Encounter Date: 4/6/2020 Status: Attested    : Tello Hernandez RN (RN, Care Manager) Cosigner: Juan Francisco Chen MD at 4/6/2020  1:47 PM    Attestation signed by Juan Francisco Chen MD at 4/6/2020  1:47 PM    Anticoagulation care reviewed.  I agree with the plan of care.    Juan Francisco Chen MD  [facility]  4/6/2020, 1:47 PM                 ANTICOAGULATION  MANAGEMENT- Home Care/Care Facility Result    Assessment     Today's INR result of 2.0 is Therapeutic (goal INR of 2.0-3.0)        Warfarin taken as previously instructed    No new diet changes affecting INR    No new medication/supplements affecting INR    Continues to tolerate warfarin with no reported s/s of bleeding or thromboembolism     Previous INR was Therapeutic    Plan:     Spoke with home care nurse Janeth discussed INR result and instructed:     Warfarin Dosing Instructions: Continue current warfarin dose 5 mg daily.    Next INR to be drawn: 1 week.     Education provided: importance of taking warfarin as instructed    Janeth verbalizes understanding and agrees to warfarin dosing plan.   ?   Tello Hernandez RN    Subjective/Objective:      Aguilar Garcia, a 70 y.o. male is established on warfarin.     Home care/care facility RN's report of [place] INR, recent warfarin dosing, diet changes, medication changes, and symptoms is documented below.    Additional findings: verbally confirmed home dose with Janeth and updated on anticoagulation calendar    Anticoagulation Episode Summary     Current INR goal:   2.0-3.0   TTR:   100.0 % (4 d)   Next INR check:   4/13/2020   INR from last check:   2.00 (4/6/2020)   Weekly max warfarin dose:      Target end date:   7/15/2020   INR check location:      Preferred lab:      Send INR reminders to:    [facility]    Indications    S/P aortic valve replacement with bioprosthetic valve [Z95.3]           Comments:            Anticoagulation Care Providers     Provider Role Specialty Phone number    Juan Francisco Chen MD Referring Internal Medicine [phone]

## 2021-06-17 NOTE — PATIENT INSTRUCTIONS - HE
Please follow up if you have any further issues.    You may contact me by phone or MyChart if you are worsening or if things are not improving.    ______________________________________________________________________     Please remember that you can call [phone] to schedule an appointment.     You can schedule appointments 24 hours a day, 7 days a week.  Sometimes the best time to schedule an appointment is after clinic hours when less people are calling in.  Weekends are another option for calling in to schedule appointments.

## 2021-06-17 NOTE — PATIENT INSTRUCTIONS - HE
Patient Instructions by Juan Francisco Chen MD at 6/7/2019  9:15 AM     Author: Juan Francisco Chen MD Service: -- Author Type: Physician    Filed: 6/7/2019  6:04 PM Encounter Date: 6/7/2019 Status: Addendum    : Juan Francisco Chen MD (Physician)    Related Notes: Original Note by Juan Francisco Chen MD (Physician) filed at 6/7/2019  9:52 AM       The new shingles shot (Shingrix) is 2 separate shots  by 2-6 months.    The cost out of pocket is around $390 for the 2 shots, so you might want to see if your insurance covers it or a portion of it prior to having it done.  Often by having it done at a pharmacy rather than a clinic, it can be cheaper for you.    It is estimated that any person's risk of shingles over their lifetime is around 10-20%.    If you have had the old shingles vaccine (Zostavax), it is about 50% effective, reducing your risk of shingles to about 5-10% over your lifetime.    The new shot is 90% effective, reducing your risk of shingles to about 1-2% over your lifetime.    Because the shot is strong, it is very common to have flu like symptoms for 2-3 days after the shot.  25-50% of patients will have fever, muscle aches or headache.    I believe that whether or not you have this vaccine is your own decision depending on your values and preferences.  The information above I give you to make an informed decision.    Juan Francisco Chen MD  [facility]    Patient Education     Your Health Risk Assessment indicates you feel you are not in good physical health.    A healthy lifestyle helps keep the body fit and the mind alert. It helps protect you from disease, helps you fight disease, and helps prevent chronic disease (disease that doesn't go away) from getting worse. This is important as you get older and begin to notice twinges in muscles and joints and a decline in the strength and stamina you once took for granted. A healthy lifestyle includes good healthcare, good  nutrition, weight control, recreation, and regular exercise. Avoid harmful substances and do what you can to keep safe. Another part of a healthy lifestyle is stay mentally active and socially involved.    Good healthcare     Have a wellness visit every year.     If you have new symptoms, let us know right away. Don't wait until the next checkup.     Take medicines exactly as prescribed and keep your medicines in a safe place. Tell us if your medicine causes problems.   Healthy diet and weight control     Eat 3 or 4 small, nutritious, low-fat, high-fiber meals a day. Include a variety of fruits, vegetables, and whole-grain foods.     Make sure you get enough calcium in your diet. Calcium, vitamin D, and exercise help prevent osteoporosis (bone thinning).     If you live alone, try eating with others when you can. That way you get a good meal and have company while you eat it.     Try to keep a healthy weight. If you eat more calories than your body uses for energy, it will be stored as fat and you will gain weight.     Recreation   Recreation is not limited to sports and team events. It includes any activity that provides relaxation, interest, enjoyment, and exercise. Recreation provides an outlet for physical, mental, and social energy. It can give a sense of worth and achievement. It can help you stay healthy.       Patient Education     Exercise for a Healthier Heart  You may wonder how you can improve the health of your heart. If youre thinking about exercise, youre on the right track. You dont need to become an athlete, but you do need a certain amount of brisk exercise to help strengthen your heart. If you have been diagnosed with a heart condition, your doctor may recommend exercise to help stabilize your condition. To help make exercise a habit, choose safe, fun activities.       Be sure to check with your health care provider before starting an exercise program.    Why exercise?  Exercising regularly offers  many healthy rewards. It can help you do all of the following:    Improve your blood cholesterol levels to help prevent further heart trouble    Lower your blood pressure to help prevent a stroke or heart attack    Control diabetes, or reduce your risk of getting this disease    Improve your heart and lung function    Reach and maintain a healthy weight    Make your muscles stronger and more limber so you can stay active    Prevent falls and fractures by slowing the loss of bone mass (osteoporosis)    Manage stress better  Exercise tips  Ease into your routine. Set small goals. Then build on them.  Exercise on most days. Aim for a total of 150 or more minutes of moderate to  vigorous intensity activity each week. Consider 40 minutes, 3 to 4 times a week. For best results, activity should last for 40 minutes on average. It is OK to work up to the 40 minute period over time. Examples of moderate-intensity activity is walking one mile in 15 minutes or 30 to 45 minutes of yard work.  Step up your daily activity level. Along with your exercise program, try being more active throughout the day. Walk instead of drive. Do more household tasks or yard work.  Choose one or more activities you enjoy. Walking is one of the easiest things you can do. You can also try swimming, riding a bike, or taking an exercise class.  Stop exercising and call your doctor if you:    Have chest pain or feel dizzy or lightheaded    Feel burning, tightness, pressure, or heaviness in your chest, neck, shoulders, back, or arms    Have unusual shortness of breath    Have increased joint or muscle pain    Have palpitations or an irregular heartbeat      7974-1335 The [employer]. [address] [place] [address], [place] [address]. All rights reserved. This information is not intended as a substitute for professional medical care. Always follow your healthcare professional's instructions.         Patient Education   Understanding USDA MyPlate  The  USDA (US Department of Agriculture) has guidelines to help you make healthy food choices. These are called MyPlate. MyPlate shows the food groups that make up healthy meals using the image of a place setting. Before you eat, think about the healthiest choices for what to put onto your plate or into your cup or bowl. To learn more about building a healthy plate, visit www.choosemyplate.gov.       The Food Groups    Fruits: Any fruit or 100% fruit juice counts as part of the Fruit Group. Fruits may be fresh, canned, frozen, or dried, and may be whole, cut-up, or pureed. Make half your plate fruits and vegetables.    Vegetables: Any vegetable or 100% vegetable juice counts as a member of the Vegetable Group. Vegetables may be fresh, frozen, canned, or dried. They can be served raw or cooked and may be whole, cut-up, or mashed. Make half your plate fruits and vegetables.     Grains: All foods made from grains are part of the Grains Group. These include wheat, rice, oats, cornmeal, and barley such as bread, pasta, oatmeal, cereal, tortillas, and grits. Grains should be no more than a quarter of your plate. At least half of your grains should be whole grains.    Protein: This group includes meat, poultry, seafood, beans and peas, eggs, processed soy products (like tofu), nuts (including nut butters), and seeds. Make protein choices no more than a quarter of your plate. Meat and poultry choices should be lean or low fat.    Dairy: All fluid milk products and foods made from milk that contain calcium, like yogurt and cheese are part of the Dairy Group. (Foods that have little calcium, such as cream, butter, and cream cheese, are not part of the group.) Most dairy choices should be low-fat or fat-free.    Oils: These are fats that are liquid at room temperature. They include canola, corn, olive, soybean, and sunflower oil. Foods that are mainly oil include mayonnaise, certain salad dressings, and soft margarines. You should  have only 5 to 7 teaspoons of oils a day. You probably already get this much from the food you eat.  Use [employer] to Help Build Your Meals  The [employer] can help you plan and track your meals and activity. You can look up individual foods to see or compare their nutritional value. You can get guidelines for what and how much you should eat. You can compare your food choices. And you can assess personal physical activities and see ways you can improve. Go to www.enVerid.gov/[employer].cker/.    1293-5160 [employer]. [address] [place] [address]. All rights reserved. This information is not intended as a substitute for professional medical care. Always follow your healthcare professional's instructions.           Patient Education   Signs of Hearing Loss  Hearing loss is a problem shared by many people. In fact, it is one of the most common health conditions, particularly as people age. Most people over age 65 have some hearing loss, and by age 80, almost everyone does. Because hearing loss usually occurs slowly over the years, you may not realize your hearing ability has gotten worse.       Have your hearing checked  Contact your [facility] care provider if you:    Have to strain to hear normal conversation.    Have to watch other peoples faces very carefully to follow what theyre saying.    Need to ask people to repeat what theyve said.    Often misunderstand what people are saying.    Turn the volume of the television or radio up so high that others complain.    Feel that people are mumbling when theyre talking to you.    Find that the effort to hear leaves you feeling tired and irritated.    Notice, when using the phone, that you hear better with 1 ear than the other.    3344-9714 [employer]. [address] [place] [address]. All rights reserved. This information is not intended as a substitute for professional medical care. Always follow your healthcare  professional's instructions.         Patient Education   Preventing Falls in the Home  As you get older, falls are more likely. Thats because your reaction time slows. Your muscles and joints may also get stiffer, making them less flexible. Illness, medications, and vision changes can also affect your balance. A fall could leave you unable to live on your own. To make your home safer, follow these tips:    Floors    Put nonskid pads under area rugs.    Remove throw rugs.    Replace worn floor coverings.    Tack carpets firmly to each step on carpeted stairs. Put nonskid strips on the edges of uncarpeted stairs.    Keep floors and stairs free of clutter and cords.    Arrange furniture so there are clear pathways.    Clean up any spills right away.    Bathrooms    Install grab bars in the tub or shower.    Apply nonskid strips or put a nonskid rubber mat in the tub or shower.    Sit on a bath chair to bathe.    Use bathmats with nonskid backing.    Lighting    Keep a flashlight in each room.    Put a nightlight along the pathway between the bedroom and the bathroom.    9982-9804 The [employer]. [address] [place] [address]. All rights reserved. This information is not intended as a substitute for professional medical care. Always follow your healthcare professional's instructions.           Advance Directive  Patients advance directive was discussed and I am comfortable with the patients wishes.  Patient Education   Personalized Prevention Plan  You are due for the preventive services outlined below.  Your care team is available to assist you in scheduling these services.  If you have already completed any of these items, please share that information with your care team to update in your medical record.  Health Maintenance   Topic Date Due   ? ZOSTER VACCINES (2 of 3) 12/31/2010   ? FALL RISK ASSESSMENT  06/05/2019   ? INFLUENZA VACCINE RULE BASED (Season Ended) 08/01/2019   ? TD 18+ HE   11/05/2020   ? ADVANCE DIRECTIVES DISCUSSED WITH PATIENT  06/05/2023   ? COLONOSCOPY  06/01/2027   ? PNEUMOCOCCAL POLYSACCHARIDE VACCINE AGE 65 AND OVER  Completed   ? PNEUMOCOCCAL CONJUGATE VACCINE FOR ADULTS (PCV13 OR PREVNAR)  Completed

## 2021-06-20 NOTE — LETTER
Letter by Juan Francisco Chen MD at      Author: Juan Francisco Chen MD Service: -- Author Type: --    Filed:  Encounter Date: 2020 Status: (Other)         [facility] DEPARTMENT [phone]    2020    Aguilar Garcia   [address] [place] [address]   : 1950       Patient prescription:      RX:  Physical therapy/occupational therapy evaluation and treatment.    DX:      ICD-10-CM    1. Hemiplegia as late effect of cerebrovascular accident (CVA) (H)  I69.359    2. Dysphagia as late effect of cerebrovascular accident (CVA)  I69.391    3. S/P aortic valve replacement with bioprosthetic valve  Z95.3    4. Cerebrovascular accident (CVA) due to embolism of right middle cerebral artery (H)  I63.411           Juan Francisco Chen MD  General Internal Medicine  [facility] [id number]    [place] License [id number]

## 2021-06-21 NOTE — LETTER
Letter by Juan Francisco Chen MD at      Author: Juan Francisco Chen MD Service: -- Author Type: --    Filed:  Encounter Date: 2021 Status: (Other)         2021    Regarding:  Aguilar Garcia   [address]  [place] [facility] [address]     To:  [place] [employer]    Policy [id number]  Claim [id number]    To whom it may concern:    I am sending you this letter in relationship to a patient of mine, Aguilar Garcia ( 1950).    Aguilar Garcia has been a patient of mine for over 5 years.    I am sending this letter in relationship to his current plan of care.    Mr. Garcia has suffered a stroke in the past and has a very dense hemiplegia which has not resolved over a year since it occurred.    His wife currently helps him with quite of bit of his activities of daily living (ADLs) at this time due to limited mobility and limited ability to do many activities.  This has been delineated in the physician's statement previous provided.    He dose not require any Long Term Care services at this time, but there is no doubt that with time he will require these services if his wife is unable to provide care for him.    If you have any questions regarding the above, feel free to contact me at [phone].       Sincerely,     Juan Francisco Chen MD  General Internal Medicine  [facility]

## 2021-06-25 NOTE — PROGRESS NOTES
Progress Notes by Juan Francisco Chen MD at 6/6/2017  3:30 PM     Author: Juan Francisco Chen MD Service: -- Author Type: Physician    Filed: 6/7/2017 10:37 PM Encounter Date: 6/6/2017 Status: Signed    : Juan Francisco Chen MD (Physician)       Assessment:     1. Medicare annual wellness visit, subsequent     2. Aortic stenosis  HM2(CBC w/o Differential)    BNP(B-type Natriuretic Peptide)    Comprehensive Metabolic Panel   3. Osteoarthritis of both knees     4. ED (erectile dysfunction)     5. HLD (hyperlipidemia)  Lipid Cascade    Comprehensive Metabolic Panel    pravastatin (PRAVACHOL) 20 MG tablet   6. Screening for prostate cancer  PSA (Prostatic-Specific Antigen), Annual Screen   7. Shortness of breath   BNP(B-type Natriuretic Peptide)   8. Nonsmoker     9. H/O iritis     10. Itchy eyes     11. Full code status         Plan:      1. Check blood work today.   2. He wishes to be full CODE STATUS.  3. Patient wishes to see cardiology at [facility] for his aortic valve to see what he needs to do next.  He hasn't had another evaluation since 2014.  4. He needs to have his heart valves evaluated if he was going to consider surgery for his knees.  5. Refilled cholesterol medication.  6. He can try over-the-counter Patanol eyedrops for his itchy eyes.  7. Request old records from associated I for his recurrent iritis.       Juan Francisco Chen MD  General Internal Medicine  [facility] [[email or web address]]     Personal office fax - [phone]   Voice mail - [phone]  E-mail - [email or web address]     Subjective:     Aguilar Garcia is a 67 y.o. male who presents for a Subsequent Annual Wellness Visit.   He also has additional issues to discuss.    The patient and I reviewed his paperwork.    We updated his vaccination status from his old records.    We reviewed his aortic stenosis.  His echocardiogram in 2014 showed moderately severe aortic stenosis with a valve area of 1 cm .  He continues to have some  shortness of breath with exertion.  He denies any dizziness, lightheadedness, syncope, or chest pain.  He would like to have an opinion from a cardiologist at [facility] as he doesn't trust his cardiologist in [place].  He hasn't been seen by them since 2014.  This was reviewed with him.    We reviewed different options for the repair of aortic stenosis these days.    We reviewed his osteoarthritis of his knees.  We do want to clear his heart before he would do anything with this.  He is not rated to proceed with this yet at this time, but he might have surgery at [place] for this if it was needed.    Reviewed his hyperlipidemia and this is doing well without issue.  We did check blood work and refill the medication today.    Reviewed prostate cancer screening and he elects to proceed with this.    He has a history of iritis and last year had to be treated for this.  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    He's had some itchy eye in this spring.  We reviewed different drops for this as well.      Immunization History   Administered Date(s) Administered   ? Influenza, inj, historic 10/08/2015   ? Pneumo Conj 13-V (2010&after) 10/08/2015   ? Pneumo Polysac 23-V 10/01/2014   ? Td, historic 11/05/2010   ? ZOSTER 11/05/2010     Immunization status: up to date and documented.    Current Outpatient Prescriptions   Medication Sig Dispense Refill   ? aspirin 81 MG EC tablet Take 81 mg by mouth daily.     ? pravastatin (PRAVACHOL) 20 MG tablet Take 1 tablet (20 mg total) by mouth at bedtime. Please keep this prescription on file for the next refill. 90 tablet 3     No current facility-administered medications for this visit.      Past Medical History:   Diagnosis Date   ? Aortic stenosis    ? ED (erectile dysfunction)    ? H/O iritis    ? HLD (hyperlipidemia)    ? Nonsmoker    ? Osteoarthritis of both knees      Past Surgical History:   Procedure Laterality Date   ? HAND SURGERY     ? KNEE ARTHROSCOPY       Sulfa  (sulfonamide antibiotics)  Family History   Problem Relation Age of Onset   ? Heart failure Mother    ? Rheum arthritis Mother    ? Other Mother      Temporal arteritis   ? Heart attack Father      Social History     Social History   ? Marital status:      Spouse name: Olivia   ? Number of children: 3   ? 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 Smoking status: Never Smoker   ? Smokeless tobacco: Not on file   ? Alcohol use Yes   ? Drug use: Not on file   ? Sexual activity: Not on file     Other Topics Concern   ? Not on file     Social History Narrative    Patient of Dr. Chen since 2015.  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        [place] in [place].     Health Maintenance   Topic Date Due   ? FALL RISK ASSESSMENT  01/12/2015   ? INFLUENZA VACCINE RULE BASED (Season Ended) 08/01/2017   ? TD 18+ HE  11/05/2020   ? ADVANCE DIRECTIVES DISCUSSED WITH PATIENT  06/07/2022   ? COLONOSCOPY  06/01/2027   ? PNEUMOCOCCAL POLYSACCHARIDE VACCINE AGE 65 AND OVER  Completed   ? PNEUMOCOCCAL CONJUGATE VACCINE FOR ADULTS (PCV13 OR PREVNAR)  Completed   ? ZOSTER VACCINE  Completed       Mood Disorder and Cognitive Impairment Screenings  Little interest or pleasure in doing things: Several days  Feeling down, depressed, or hopeless: Not at all  Trouble falling or staying asleep, or sleeping too much: Several days  Feeling tired or having little energy: Several days  Poor appetite or overeating: Not at all  Feeling bad about yourself - or that you are a failure or have let yourself or your family down: Not at all  Trouble concentrating on things, such as reading the newspaper or watching television: Not at all  Moving or speaking so slowly that other people could have noticed. Or the opposite - being so fidgety or restless that you have been moving around a lot more than usual: Not at all  Thoughts that you would be better off dead, or of hurting yourself in some  "way: Not at all  PHQ-9 Total Score: 3   Cognitive Impairment and Additional Screening:  No difficulty.    Functional Ability/Level of Safety  Fall Risk Factors:  None  Home Safety Risk Factors: None    Advanced Directive:  I have had an Advanced Directive discussion with the patient.  He wishes to be full code.  He will get us a copy of his advanced directive in the future.    Co-Managers and Medical Equipment/Suppliers:  Patient Care Team:  Juan Francisco Chen MD as PCP - General (Internal Medicine)  Rocco Solis MD as Physician (Dermatology)     Review of Systems  Review of Systems    The 10-system review of systems and health history form for [facility] was completed by patient, reviewed by me, and pertinent problems are reviewed above.       Objective:     Vitals:    06/06/17 1523   BP: 120/74   Pulse: 68   Temp: 98.5  F (36.9  C)   TempSrc: Oral   SpO2: 95%   Weight: (!) 250 lb 6.4 oz (113.6 kg)   Height: 5' 11\" (1.803 m)       The patient is comfortable, no acute distress.  Mood good.  Insight is good.  No skin lesions or nodules of concern.  Ears clear.  Eyes are nonicteric.  Pupils equal and reactive.  Throat is clear.  Neck is supple without mass, no thyromegaly. No cervical or epitrochlear adenopathy.  Heart regular rate and rhythm.  He does have a 2/6 systolic ejection quality murmur at the base of his heart.  Lungs clear to auscultation bilaterally.  Respiratory effort good.  Abdomen soft and nontender.  No hepatosplenomegaly.  Extremities show   No edema.  His gait is somewhat affected by his knees but there is no effusion in his knees.     ______________________________________________________________________    Recent Results (from the past 240 hour(s))   HM2(CBC w/o Differential)   Result Value Ref Range    WBC 10.4 4.0 - 11.0 thou/uL    RBC 5.18 4.40 - 6.20 mill/uL    Hemoglobin 16.3 14.0 - 18.0 g/dL    Hematocrit 48.5 40.0 - 54.0 %    MCV 94 80 - 100 fL    MCH 31.4 27.0 - 34.0 pg    MCHC 33.6 " 32.0 - 36.0 g/dL    RDW 11.5 11.0 - 14.5 %    Platelets 206 140 - 440 thou/uL    MPV 8.3 7.0 - 10.0 fL   BNP(B-type Natriuretic Peptide)   Result Value Ref Range    BNP 29 0 - 62 pg/mL   PSA (Prostatic-Specific Antigen), Annual Screen   Result Value Ref Range    PSA 2.2 0.0 - 4.5 ng/mL   Lipid Cascade   Result Value Ref Range    Cholesterol 188 <=199 mg/dL    Triglycerides 176 (H) <=149 mg/dL    HDL Cholesterol 43 >=40 mg/dL    LDL Calculated 110 <=129 mg/dL    Patient Fasting > 8hrs? Unknown    Comprehensive Metabolic Panel   Result Value Ref Range    Sodium 144 136 - 145 mmol/L    Potassium 4.4 3.5 - 5.0 mmol/L    Chloride 108 (H) 98 - 107 mmol/L    CO2 25 22 - 31 mmol/L    Anion Gap, Calculation 11 5 - 18 mmol/L    Glucose 96 70 - 125 mg/dL    BUN 15 8 - 22 mg/dL    Creatinine 1.21 0.70 - 1.30 mg/dL    GFR MDRD Af Amer >60 >60 mL/min/1.73m2    GFR MDRD Non Af Amer 60 (L) >60 mL/min/1.73m2    Bilirubin, Total 0.6 0.0 - 1.0 mg/dL    Calcium 8.9 8.5 - 10.5 mg/dL    Protein, Total 7.2 6.0 - 8.0 g/dL    Albumin 4.2 3.5 - 5.0 g/dL    Alkaline Phosphatase 78 45 - 120 U/L    AST 21 0 - 40 U/L    ALT 19 0 - 45 U/L       ______________________________________________________________________

## 2021-06-26 NOTE — PROGRESS NOTES
Progress Notes by Juan Francisco Chen MD at 6/5/2018  8:50 AM     Author: Juan Francisco Chen MD Service: -- Author Type: Physician    Filed: 6/6/2018 10:50 PM Encounter Date: 6/5/2018 Status: Signed    : Juan Francisco Chen MD (Physician)            [facility]    Comprehensive and complex medical care - Chronic disease management - Shared decision making - Care coordination - Compassionate care    Patient advocacy - Rational deprescribing - Minimally disruptive medicine - Ethical focus - Customized care    ______________________________________________________________________     Date of Service: 6/5/2018  Primary Provider: Juan Francisco Chen MD    Patient Care Team:  Juan Francisco Chen MD as PCP - General (Internal Medicine)  Rocco Solis MD as Physician (Dermatology)   ______________________________________________________________________     Patient's Pharmacy:    [facility] - [place], [place] - [address]  [address] [address]  Phone: [phone] Fax: [phone]     Patient's Contacts:  Name Home Phone Work Phone Mobile Phone Relationship Lgl VERONA [place] [phone]   Spouse      Patient's Insurance:    Payor: MEDICARE / Plan: MEDICARE A AND B / Product Type: Medicare /     Subjective:     History of present illness:    Aguilar Garcia is an 68 y.o. male here for an annual wellness visit.    Patient comes in today for routine annual wellness visit and for other issues.    We reviewed his aortic valve disease.  He has moderate aortic stenosis.  He has seen the [facility] for this.  He would like to move ahead on treating this in the future.  He will see what the cardiologist says when he sees them back.  He has a bicuspid aortic valve.  He does get short of breath with more extreme exertion.  He denies any chest pain or presyncope.    We do not have the echocardiogram nor the stress test reports from the [facility] and so we will request these.    He did have a chest x-ray in relationship to his shortness of breath and no abnormalities were noted.    He has been having increasing problems with knee pain in both knees.  He would like to proceed with knee replacements in the next couple years.  This is what has him thinking about doing a heart valve when he would be able to.    He would like a referral for his knees to orthopedics and this will be placed today.  He would like to consider injection therapy for temporizing his knee pain at this point.    He had 2 tick bites 2 weeks ago one on his back and one on his thigh.  He has not had any subsequent rashes/joint pains, fevers or chills.  He would like to have them examined.    He has had some issues with hearing loss and would like to see an audiologist.  He does feel like he is losing some hearing.    Reviewed his erectile dysfunction to try Viagra for this.  He does not get complete erections.    Reviewed hyperlipidemia and cholesterol is doing well.  Tolerating the medication without significant muscle symptoms.     We reviewed his other issues noted in the assessment but not specifically addressed in the HPI above.     Review of Systems:  The 10-system review of systems and health history form for [facility] was completed by patient, reviewed by me, and pertinent problems are reviewed above.  He does have some numbness in his right thumb.    ______________________________________________________________________     Patient Active Problem List   Diagnosis   ? Aortic stenosis   ? Osteoarthritis of both knees   ? HLD (hyperlipidemia)   ? ED (erectile dysfunction)   ? Nonsmoker   ? Full code status     Past Medical History:   Diagnosis Date   ? Aortic stenosis    ? ED (erectile dysfunction)    ? H/O iritis    ? HLD (hyperlipidemia)    ? Nonsmoker    ? Osteoarthritis of both knees       Past Surgical History:   Procedure Laterality Date   ? HAND SURGERY     ? KNEE  "ARTHROSCOPY        Family History   Problem Relation Age of Onset   ? Heart failure Mother    ? Rheum arthritis Mother    ? Other Mother      Temporal arteritis   ? Heart attack Father      Family history is otherwise noncontributory.     Social History     Social History   ? Marital status:      Spouse name: Olivia   ? Number of children: 3   ? 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 Smoking status: Never Smoker   ? Smokeless tobacco: Not on file   ? Alcohol use Yes   ? Drug use: Not on file   ? Sexual activity: Not on file     Other Topics Concern   ? Not on file     Social History Narrative    Patient of Dr. Chen since 2015.  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        [place] in [place].      Current Outpatient Prescriptions   Medication Sig   ? aspirin 81 MG EC tablet Take 81 mg by mouth daily.   ? pravastatin (PRAVACHOL) 20 MG tablet Take 1 tablet (20 mg total) by mouth at bedtime.      Allergies: Sulfa (sulfonamide antibiotics)     Immunization History   Administered Date(s) Administered   ? Influenza high dose, seasonal 10/07/2015, 10/20/2016   ? Influenza, inj, historic,unspecified 10/08/2015   ? Influenza,trivalent,im, 65+yrs 10/09/2017   ? Pneumo Conj 13-V (2010&after) 10/07/2015   ? Pneumo Polysac 23-V 10/01/2014   ? Td,adult,historic,unspecified 11/05/2010   ? ZOSTER, LIVE 11/05/2010      Objective:     Vital Signs:   Visit Vitals   ? /80 (Patient Site: Right Arm, Patient Position: Sitting, Cuff Size: Adult Regular)   ? Pulse 71   ? Ht 5' 9.25\" (1.759 m)   ? Wt (!) 238 lb 12.8 oz (108.3 kg)   ? SpO2 94%   ? BMI 35.01 kg/m2      Wt Readings from Last 3 Encounters:   06/05/18 (!) 238 lb 12.8 oz (108.3 kg)   06/06/17 (!) 250 lb 6.4 oz (113.6 kg)   06/29/16 (!) 251 lb 12.8 oz (114.2 kg)     BP Readings from Last 3 Encounters:   06/05/18 124/80   06/06/17 120/74   06/29/16 124/76     Vision Screening:  No exam data present "     PHYSICAL EXAM  The patient is comfortable, no acute distress.  Mood good.  Insight is good.  No skin lesions or nodules of concern.  Ears clear.  Eyes are nonicteric.  Pupils equal and reactive.  Throat is clear.  Neck is supple without mass, no thyromegaly. No cervical or epitrochlear adenopathy.  Heart regular rate and rhythm.  There is a 3/6 systolic ejection quality murmur in the aortic position. Lungs clear to auscultation bilaterally.  Respiratory effort good.  Abdomen soft and nontender.  No hepatosplenomegaly.  No aortic aneurysm detected.  No hernia noted.  Extremities show no edema.  He has no normal testicular exam.  Prostate exam is deferred this year, but will consider next year.  Skin exam shows evidence of previous tick bites but no active disease.  He has no effusions of his knees.    Assessment Results 6/5/2018   Activities of Daily Living No help needed   Instrumental Activities of Daily Living No help needed   Mini Cog Total Score 5   Some recent data might be hidden     A Mini-Cog score of 0-2 suggests the possibility of dementia, score of 3-5 suggests no dementia    Diagnostics:     Recent Results (from the past 240 hour(s))   Comprehensive Metabolic Panel   Result Value Ref Range    Sodium 142 136 - 145 mmol/L    Potassium 4.6 3.5 - 5.0 mmol/L    Chloride 108 (H) 98 - 107 mmol/L    CO2 25 22 - 31 mmol/L    Anion Gap, Calculation 9 5 - 18 mmol/L    Glucose 100 70 - 125 mg/dL    BUN 15 8 - 22 mg/dL    Creatinine 1.08 0.70 - 1.30 mg/dL    GFR MDRD Af Amer >60 >60 mL/min/1.73m2    GFR MDRD Non Af Amer >60 >60 mL/min/1.73m2    Bilirubin, Total 0.7 0.0 - 1.0 mg/dL    Calcium 9.6 8.5 - 10.5 mg/dL    Protein, Total 7.2 6.0 - 8.0 g/dL    Albumin 4.2 3.5 - 5.0 g/dL    Alkaline Phosphatase 89 45 - 120 U/L    AST 22 0 - 40 U/L    ALT 16 0 - 45 U/L   Lipid Cascade   Result Value Ref Range    Cholesterol 184 <=199 mg/dL    Triglycerides 143 <=149 mg/dL    HDL Cholesterol 44 >=40 mg/dL    LDL Calculated  111 <=129 mg/dL    Patient Fasting > 8hrs? Yes    BNP(B-type Natriuretic Peptide)   Result Value Ref Range    BNP 28 0 - 64 pg/mL   HM2(CBC w/o Differential)   Result Value Ref Range    WBC 8.0 4.0 - 11.0 thou/uL    RBC 4.96 4.40 - 6.20 mill/uL    Hemoglobin 15.8 14.0 - 18.0 g/dL    Hematocrit 46.9 40.0 - 54.0 %    MCV 95 80 - 100 fL    MCH 31.8 27.0 - 34.0 pg    MCHC 33.6 32.0 - 36.0 g/dL    RDW 11.1 11.0 - 14.5 %    Platelets 189 140 - 440 thou/uL    MPV 7.9 7.0 - 10.0 fL   Thyroid Stimulating Hormone (TSH)   Result Value Ref Range    TSH 1.95 0.30 - 5.00 uIU/mL        ______________________________________________________________________     PHQ-2 Total Score: 0 (6/5/2018  9:00 AM)  PHQ-9 Total Score: 0 (6/5/2018  9:00 AM)  ______________________________________________________________________      Assessment:     1. Medicare annual wellness visit, subsequent    2. HLD (hyperlipidemia)    3. Aortic stenosis    4. Osteoarthritis of both knees    5. ED (erectile dysfunction)    6. Dyspnea     7. Hearing loss    8. Tick bite    9. Full code status    10. Nonsmoker    11. Advance directive discussed with patient         Plan:     1. Check blood work today.  See relevant orders and diagnosis associations at the bottom of this note.  2. Follow-up at [facility] related to his heart valve and shortness of breath.  3. We did talk about additional testing that can be done in relationship to his breathing, but he will follow-up with this at [facility].  4. Referred to Dr. Rocco Duong in relationship to his knee arthritis.  5. Referred to audiology for his hearing loss.  6. No signs of Lyme disease or other tickborne disease.  7. Given a prescription for sildenafil both 200 mg and 100 mg to bring to the pharmacy to try.  8. He is to follow-up in the meantime if he worsens.         Juan Francisco Chen MD  General Internal Medicine  [facility]     Personal office fax - [phone]   Voice mail -  [phone]  E-mail - [email or web address]     Return in about 1 year (around 6/5/2019), or if symptoms worsen or fail to improve.     No future appointments.    ______________________________________________________________________     Relevant ICD-10 codes and order associations:      ICD-10-CM    1. Medicare annual wellness visit, subsequent Z00.00    2. HLD (hyperlipidemia) E78.5 pravastatin (PRAVACHOL) 20 MG tablet     Comprehensive Metabolic Panel     Lipid Cascade     Thyroid Stimulating Hormone (TSH)   3. Aortic stenosis I35.0 BNP(B-type Natriuretic Peptide)     HM2(CBC w/o Differential)   4. Osteoarthritis of both knees M17.0 Ambulatory referral to Orthopedics   5. ED (erectile dysfunction) N52.9    6. Dyspnea  R06.00 BNP(B-type Natriuretic Peptide)     HM2(CBC w/o Differential)   7. Hearing loss H91.90 Ambulatory referral to Audiology   8. Tick bite W57.XXXA    9. Full code status Z78.9    10. Nonsmoker Z78.9    11. Advance directive discussed with patient Z71.89         Identified Health Risks:     A personalized health plan based on the identified health risks was provided to the patient on the AVS.    ______________________________________________________________________     The following health maintenance schedule was reviewed with the patient and provided in printed form in the after visit summary:     Health Maintenance   Topic Date Due   ? FALL RISK ASSESSMENT  06/05/2019   ? TD 18+ HE  11/05/2020   ? ADVANCE DIRECTIVES DISCUSSED WITH PATIENT  06/07/2022   ? COLONOSCOPY  06/01/2027   ? PNEUMOCOCCAL POLYSACCHARIDE VACCINE AGE 65 AND OVER  Completed   ? INFLUENZA VACCINE RULE BASED  Completed   ? PNEUMOCOCCAL CONJUGATE VACCINE FOR ADULTS (PCV13 OR PREVNAR)  Completed   ? ZOSTER VACCINE  Completed        ______________________________________________________________________     The patient was provided with suggestions to help him develop a healthy lifestyle.   He is at risk for lack of exercise  and has been provided with information to increase physical activity for the benefit of his well-being.  The patient was counseled and encouraged to consider modifying their diet and eating habits. He was provided with information on recommended healthy diet options.  The patient was provided with written information regarding signs of hearing loss.  Patient's advanced directive was discussed and I am comfortable with the patient's wishes.

## 2021-06-27 ENCOUNTER — HEALTH MAINTENANCE LETTER (OUTPATIENT)
Age: 71
End: 2021-06-27

## 2021-06-27 NOTE — PROGRESS NOTES
Progress Notes by Juan Francisco Chen MD at 6/7/2019  9:15 AM     Author: Juan Francisco Chen MD Service: -- Author Type: Physician    Filed: 6/7/2019 11:13 PM Encounter Date: 6/7/2019 Status: Signed    : Juan Francisco Chen MD (Physician)            [facility]    Comprehensive and complex medical care - Chronic disease management - Shared decision making - Care coordination - Compassionate care    Patient advocacy - Rational deprescribing - Minimally disruptive medicine - Ethical focus - Customized care    ______________________________________________________________________     Date of Service: 6/7/2019  Primary Provider: Juan Francisco Chen MD    Patient Care Team:  Juan Francisco Chen MD as PCP - General (Internal Medicine)  Rocco Solis MD as Physician (Dermatology)   ______________________________________________________________________     Patient's Pharmacy:    [facility] - [facility] [address]  [address] [address]  Phone: [phone] Fax: [phone]     Patient's Contacts:  Name Home Phone Work Phone Mobile Phone Relationship Lgl VERONA [place] [phone]   Spouse      Patient's Insurance:    Payor: MEDICARE / Plan: MEDICARE A AND B / Product Type: Medicare /     Subjective:     History of present illness:    Aguilar Garcia is an 69 y.o. male here for an annual wellness visit.    Generally doing well overall.    Has aortic stenosis and insufficiency and saw cardiology at the [facility].  No new symptoms - but maybe some mild progression on echocardiogram and blood work and this was reviewed with him today.    Osteoarthritis (OA) continues to bother.  Had corticosteroid injection last year without help.  Okay on flat surfaces but bad with stairs and hills.    Reviewed hyperlipidemia and cholesterol is doing well.  Tolerating the medication without significant muscle symptoms.      Has some  intermittent tingling in the toes without loss of strength.    We reviewed his other issues noted in the assessment but not specifically addressed in the HPI above.     Review of Systems:  The 10-system review of systems and health history form for [facility] was completed by patient, reviewed by me, and pertinent problems are reviewed above.     ______________________________________________________________________     Active Problem List:  Problem List as of 6/7/2019 Reviewed: 6/7/2019  6:04 PM by Juna Francisco Chen MD       High    Full code status    Nonsmoker       Medium    Aortic stenosis    Osteoarthritis of both knees       Low    ED (erectile dysfunction)    HLD (hyperlipidemia)       Unprioritized    Obesity (BMI 35.0-39.9) with comorbidity (H)           Past Medical History:   Diagnosis Date   ? Aortic stenosis    ? ED (erectile dysfunction)    ? H/O iritis    ? HLD (hyperlipidemia)    ? Nonsmoker    ? Osteoarthritis of both knees       Past Surgical History:   Procedure Laterality Date   ? HAND SURGERY     ? KNEE ARTHROSCOPY        Family History   Problem Relation Age of Onset   ? Heart failure Mother    ? Rheum arthritis Mother    ? Other Mother         Temporal arteritis   ? Heart attack Father    ? No Medical Problems Daughter    ? No Medical Problems Daughter    ? Melanoma Son      Family history is otherwise noncontributory.     Social History     Socioeconomic History   ? Marital status:      Spouse name: Olivia   ? Number of children: 3   ? Years of education: None   ? Highest education level: None   Occupational History   ? Occupation: Owns own business   Social Needs   ? Financial resource strain: None   ? Food insecurity:     Worry: None     Inability: None   ? Transportation needs:     Medical: None     Non-medical: None   Tobacco Use   ? Smoking status: Never Smoker   ? Smokeless tobacco: Never Used   Substance and Sexual Activity   ? Alcohol use: Yes   ? Drug use: None   ? Sexual  "activity: None   Lifestyle   ? Physical activity:     Days per week: None     Minutes per session: None   ? Stress: None   Relationships   ? Social connections:     Talks on phone: None     Gets together: None     Attends Orthodoxy service: None     Active member of club or organization: None     Attends meetings of clubs or organizations: None     Relationship status: None   ? Intimate partner violence:     Fear of current or ex partner: None     Emotionally abused: None     Physically abused: None     Forced sexual activity: None   Other Topics Concern   ? None   Social History Narrative    Patient of Dr. Chen since 2015.  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        [place] in [place].      Current Outpatient Medications   Medication Sig   ? aspirin 81 MG EC tablet Take 81 mg by mouth daily.   ? pravastatin (PRAVACHOL) 20 MG tablet Take 1 tablet (20 mg total) by mouth at bedtime.      Allergies: Sulfa (sulfonamide antibiotics)     Immunization History   Administered Date(s) Administered   ? Influenza high dose, seasonal 10/07/2015, 10/20/2016   ? Influenza, inj, historic,unspecified 10/08/2015, 10/01/2018   ? Influenza,trivalent,im, 65+yrs 10/09/2017   ? Pneumo Conj 13-V (2010&after) 10/07/2015   ? Pneumo Polysac 23-V 10/01/2014   ? Td,adult,historic,unspecified 11/05/2010   ? ZOSTER, LIVE 11/05/2010      Objective:     Vital Signs:   Visit Vitals  /74   Pulse 72   Ht 5' 9.25\" (1.759 m)   Wt (!) 239 lb 11.2 oz (108.7 kg)   SpO2 96%   BMI 35.14 kg/m       Wt Readings from Last 3 Encounters:   06/07/19 (!) 239 lb 11.2 oz (108.7 kg)   06/05/18 (!) 238 lb 12.8 oz (108.3 kg)   06/06/17 (!) 250 lb 6.4 oz (113.6 kg)     BP Readings from Last 3 Encounters:   06/07/19 132/74   06/05/18 124/80   06/06/17 120/74     Vision Screening:  No exam data present     PHYSICAL EXAM  The patient is comfortable, no acute distress.  Mood good.  Insight is good.  No skin lesions or nodules of concern.  Ears clear.  Eyes are " nonicteric.  Pupils equal and reactive.  Throat is clear.  Neck is supple without mass, no thyromegaly. No cervical or epitrochlear adenopathy.  Heart regular rate and rhythm. There is a 3/6 systolic ejection quality murmur in the aortic position. There is a diastolic component as well.  Lungs clear to auscultation bilaterally.  Respiratory effort good.  Abdomen soft and nontender.  No hepatosplenomegaly.  No aortic aneurysm detected.  No hernia noted.  Extremities show no edema.  exam shows normal testes, normal penis.  Prostate is 2+, smooth, nontender.      Assessment Results 6/7/2019   Activities of Daily Living No help needed   Instrumental Activities of Daily Living No help needed   Get Up and Go Score Less than 12 seconds   Mini Cog Total Score 5   Some recent data might be hidden     A Mini-Cog score of 0-2 suggests the possibility of dementia, score of 3-5 suggests no dementia    Diagnostics:     Recent Results (from the past 240 hour(s))   PSA, Annual Screen (Prostatic-Specific Antigen)   Result Value Ref Range    PSA 2.0 0.0 - 4.5 ng/mL   Hepatic Profile   Result Value Ref Range    Bilirubin, Total 0.4 0.0 - 1.0 mg/dL    Bilirubin, Direct 0.2 <=0.5 mg/dL    Protein, Total 6.8 6.0 - 8.0 g/dL    Albumin 4.0 3.5 - 5.0 g/dL    Alkaline Phosphatase 75 45 - 120 U/L    AST 20 0 - 40 U/L    ALT 17 0 - 45 U/L   Glycosylated Hemoglobin A1c   Result Value Ref Range    Hemoglobin A1c 5.7 3.5 - 6.0 %   Iron and Transferrin Iron Binding Capacity   Result Value Ref Range    Iron 122 42 - 175 ug/dL    Transferrin 260 212 - 360 mg/dL    Transferrin Saturation, Calculated 38 20 - 50 %    Transferrin IBC, Calculated 325 313 - 563 ug/dL   Vitamin B12   Result Value Ref Range    Vitamin B-12 234 213 - 816 pg/mL        ______________________________________________________________________     PHQ-2 Total Score: 0 (6/7/2019  9:28 AM)    No data recorded  ______________________________________________________________________      BMI Readings from Last 1 Encounters:   06/07/19 35.14 kg/m           Assessment:     1. Medicare annual wellness visit, subsequent    2. Mixed hyperlipidemia    3. Screening for prostate cancer    4. Elevated blood sugar    5. Tingling of both feet    6. Other specified abnormal findings of blood chemistry     7. Morbid obesity (H)    8. Primary osteoarthritis of both knees    9. Nonrheumatic aortic valve stenosis         Plan:     1. Check blood work today.  See relevant orders and diagnosis associations at the bottom of this note.   2. Reviewed SHINGRIX with patient today.   3. Refilled medications.  4. Obesity reviewed with patient today and weight loss.  5. 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  6. Follow up with [facility] related to his aortic disease.         Juan Francisco Chen MD  General Internal Medicine  [facility]     Personal office fax - [phone]   Voice mail - [phone]  E-mail - [email or web address]     Return in about 1 year (around 6/7/2020).     No future appointments.    ______________________________________________________________________     Relevant ICD-10 codes and order associations:      ICD-10-CM    1. Medicare annual wellness visit, subsequent Z00.00    2. Mixed hyperlipidemia E78.2 pravastatin (PRAVACHOL) 20 MG tablet     Hepatic Profile   3. Screening for prostate cancer Z12.5 PSA, Annual Screen (Prostatic-Specific Antigen)   4. Elevated blood sugar R73.9 Glycosylated Hemoglobin A1c     Iron and Transferrin Iron Binding Capacity   5. Tingling of both feet R20.2 Vitamin B12     Methylmalonic Acid (MMA), Quantitative   6. Other specified abnormal findings of blood chemistry  R79.89 Iron and Transferrin Iron Binding Capacity   7. Morbid obesity (H) E66.01    8. Primary osteoarthritis of both knees M17.0    9. Nonrheumatic aortic valve stenosis I35.0         Identified Health Risks:     A personalized health plan based on the identified health risks  was provided to the patient on the AVS.    ______________________________________________________________________     The following health maintenance schedule was reviewed with the patient and provided in printed form in the after visit summary:     Health Maintenance   Topic Date Due   ? ZOSTER VACCINES (2 of 3) 12/31/2010   ? FALL RISK ASSESSMENT  06/07/2020   ? TD 18+ HE  11/05/2020   ? ADVANCE DIRECTIVES DISCUSSED WITH PATIENT  06/07/2024   ? COLONOSCOPY  06/01/2027   ? PNEUMOCOCCAL POLYSACCHARIDE VACCINE AGE 65 AND OVER  Completed   ? INFLUENZA VACCINE RULE BASED  Completed   ? PNEUMOCOCCAL CONJUGATE VACCINE FOR ADULTS (PCV13 OR PREVNAR)  Completed        ______________________________________________________________________

## 2021-06-28 NOTE — PROGRESS NOTES
Progress Notes by Juan Francisco Chen MD at 3/27/2020 11:20 AM     Author: Juan Francisco Chen MD Service: -- Author Type: Physician    Filed: 3/30/2020  2:09 PM Encounter Date: 3/27/2020 Status: Signed    : Juan Francisco Chen MD (Physician)              [facility]    Comprehensive and complex medical care - Chronic disease management - Shared decision making - Care coordination - Compassionate care    Patient advocacy - Rational deprescribing - Minimally disruptive medicine - Ethical focus - Customized care          Date of Service: 3/27/2020  Primary Provider: Juan Francisco Chen MD    Patient Care Team:  Juan Francisco Chen MD as PCP - General (Internal Medicine)  Rocco Solis MD as Physician (Dermatology)  Juan Francisco Chen MD as Assigned PCP     ______________________________________________________________________     Patient's Pharmacy:      [facility] - [facility] [address]  [address] [address]  Phone: [phone] Fax: [phone]     Patient's Contacts:  Name Home Phone Work Phone Mobile Phone Relationship Lgl VERONA [place] [phone]   Spouse        Patient's Insurance:    Payor: MEDICARE / Plan: MEDICARE A AND B / Product Type: Medicare /           Aguilar SANJAY Garcia is a 70 y.o. male who comes in today for:    Chief Complaint   Patient presents with   ? Hospital Visit Follow Up     stroke, open heart surgery, TCU, aortic valve repair       Active Problem List:  Problem List as of 3/27/2020 Reviewed: 12/15/2019 10:19 PM by Juan Francisco Chen MD       High    CVA (cerebral vascular accident) (H)    Full code status    Nonsmoker       Medium    Aortic stenosis    Osteoarthritis of both knees       Low    ED (erectile dysfunction)    HLD (hyperlipidemia)       Unprioritized    Chronic iritis of right eye    Dysphagia as late effect of cerebrovascular accident (CVA)    Hemiplegia as late effect of  cerebrovascular accident (CVA) (H)    Obesity (BMI 35.0-39.9) with comorbidity (H)    S/P aortic valve replacement with bioprosthetic valve           Current Outpatient Medications   Medication Sig   ? acetaminophen (TYLENOL) 500 MG tablet Take 1,000 mg by mouth every 6 (six) hours as needed for pain.   ? aspirin 81 MG EC tablet Take 81 mg by mouth daily.   ? atorvastatin (LIPITOR) 80 MG tablet Take 1 tablet (80 mg total) by mouth daily.   ? cholecalciferol, vitamin D3, 2,000 unit Tab Take by mouth.   ? cyanocobalamin 1000 MCG tablet Take 1,000 mcg by mouth.   ? metoprolol tartrate (LOPRESSOR) 25 MG tablet Take 1 tablet (25 mg total) by mouth 2 (two) times a day.   ? senna-docusate (PERICOLACE) 8.6-50 mg tablet Take 2 tablets by mouth.   ? sertraline (ZOLOFT) 50 MG tablet Take 50 mg by mouth.   ? tamsulosin (FLOMAX) 0.4 mg cap Take 1 capsule (0.4 mg total) by mouth daily after supper.   ? warfarin ANTICOAGULANT (COUMADIN/JANTOVEN) 5 MG tablet TAKE 1 TABLET BY MOUTH AT BEDTIME. CHECK INR WITH FIRST HOME CARE VISIT   ? FLUoxetine (PROZAC) 20 MG capsule Take 1 capsule (20 mg total) by mouth daily.     Social History     Social History Narrative    Patient of Dr. Chen since 2015.  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        [place] in [place].       Subjective:     Patient comes in today with his wife.  This visit was during the coronavirus outbreak.    We reviewed his aortic valve replacement.  He had this at [facility].  This went well for him.  He had no major problems with the valve replacement itself.  He recovered quite well.  He is on warfarin for this through July and then he will be off of it.  He has follow-up with his cardiologist scheduled.    Reviewed his stroke with left-sided hemiplegia.  His left leg is working better for him.  He is able to use a walker.  His left arm is not as well.  He is not able to do a whole lot with it.  His therapy has been on hold with the whole coronavirus issue.  His wife is  trying to step in for this.  He is using therapy bands and working on standing and balancing.    We reviewed his high blood pressure and his blood pressure generally has been doing better at this time with the current treatment plan.    His knee pain has been worse especially on the left knee and he hopes to proceed with knee replacement here in the future.    We reviewed his mood next.  He is on sertraline for this.  He has had low motivation and is been down.  He struggles with the limitations imposed upon him from the stroke.  He is sleeping okay at night.  He is not really noticed any benefit from the sertraline and does not want to increase it further at this point.    His prostate is doing okay at this point.    We reviewed his other issues noted in the assessment but not specifically addressed in the HPI above.     Past medical, family and social history reviewed today.     Comprehensive review of systems was performed today with no major problems noted except as above.     Objective:     Wt Readings from Last 3 Encounters:   06/07/19 (!) 239 lb 11.2 oz (108.7 kg)   06/05/18 (!) 238 lb 12.8 oz (108.3 kg)   06/06/17 (!) 250 lb 6.4 oz (113.6 kg)     BP Readings from Last 3 Encounters:   03/27/20 118/72   12/09/19 122/64   06/07/19 132/74     /72   Pulse 72   SpO2 95%    The patient is comfortable, no acute distress.  Mood flat.  Insight good.  Eyes are nonicteric.  Neck is supple without mass.  No cervical adenopathy.  No thyromegaly. Heart regular rate and rhythm.  Lungs clear to auscultation bilaterally.  Respiratory effort is good.  Abdomen soft and nontender.  No hepatosplenomegaly.  Extremities no edema.  His left-sided hemiplegia is still present especially in the left upper extremity.  No other skin lesions of concern.      Diagnostics:     Results for orders placed or performed in visit on 03/23/20   INR   Result Value Ref Range    INR 2.30 (!) 0.9 - 1.1       Assessment:     1. Moderate major  depression (H) - we will change his medications today.   2. Cerebrovascular accident (CVA) due to embolism of right middle cerebral artery (H)-slow recovery   3. Mixed hyperlipidemia    4. Essential hypertension    5. Benign prostatic hyperplasia with urinary obstruction    6. Hemiplegia as late effect of cerebrovascular accident (CVA) (H)-still fairly dense.   7. Morbid obesity (H)-work on weight loss.   8. Constipation, unspecified constipation type    9. Primary osteoarthritis of both knees    10. S/P aortic valve replacement with bioprosthetic valve        Quality review:     PHQ-2 Total Score: 0 (6/7/2019  9:28 AM)      No data recorded  ______________________________________________________________________     BMI Readings from Last 1 Encounters:   06/07/19 35.14 kg/m        Plan:     1. Stop sertraline.  2. Start fluoxetine 20 mg a day.    3. Follow-up with cardiology.  4. Continue anticoagulation with warfarin through July, then stop.  5. Continue current medications.  6. Continue bowel protocol.  7. Follow-up with orthopedics when able.  8. Follow-up sooner if issues       Juan Francisco Chen MD  General Internal Medicine  [facility]    Personal office fax - [phone]   Voice mail - [phone]  E-mail - [email or web address]     Return in about 3 months (around 6/27/2020) for visit and blood work.     No future appointments.      ______________________________________________________________________     Relevant ICD-10 codes and order associations:      ICD-10-CM    1. Moderate major depression (H)  F32.1 FLUoxetine (PROZAC) 20 MG capsule   2. Cerebrovascular accident (CVA) due to embolism of right middle cerebral artery (H)  I63.411    3. Mixed hyperlipidemia  E78.2 atorvastatin (LIPITOR) 80 MG tablet   4. Essential hypertension  I10 metoprolol tartrate (LOPRESSOR) 25 MG tablet   5. Benign prostatic hyperplasia with urinary obstruction  N40.1 tamsulosin (FLOMAX) 0.4 mg cap     N13.8    6. Hemiplegia as late effect of cerebrovascular accident (CVA) (H)  I69.359    7. Morbid obesity (H)  E66.01    8. Constipation, unspecified constipation type  K59.00    9. Primary osteoarthritis of both knees  M17.0    10. S/P aortic valve replacement with bioprosthetic valve  Z95.3

## 2021-06-28 NOTE — PROGRESS NOTES
Progress Notes by Juan Francisco Chen MD at 12/9/2019  2:30 PM     Author: Juan Francisco Chen MD Service: -- Author Type: Physician    Filed: 12/19/2019 11:18 AM Encounter Date: 12/9/2019 Status: Signed    : Juan Francisco Chen MD (Physician)              [facility]    Comprehensive and complex medical care - Chronic disease management - Shared decision making - Care coordination - Compassionate care    Patient advocacy - Rational deprescribing - Minimally disruptive medicine - Ethical focus - Customized care          Date of Service: 12/9/2019  Primary Provider: Juan Francisco Chen MD    Patient Care Team:  Juan Francisco Chen MD as PCP - General (Internal Medicine)  Rocco Solis MD as Physician (Dermatology)  Juan Francisco Chen MD as Assigned PCP     ______________________________________________________________________     Patient's Pharmacy:    [facility] [address] [facility]  [address] [facility] [address]  Phone: [phone] Fax: [phone]     Patient's Contacts:  Name Home Phone Work Phone Mobile Phone Relationship Lgl VERONA Ireland [phone]   Spouse      Patient's Insurance:    Payor: MEDICARE / Plan: MEDICARE A AND B / Product Type: Medicare /           Aguilar SANJAY Garcia is a 69 y.o. male who comes in today for:    Chief Complaint   Patient presents with   ? Hospital Visit Follow Up     stroke       Active Problem List:  Problem List as of 12/9/2019 Reviewed: 6/7/2019  6:04 PM by Juan Francisco Chen MD       High    Full code status    Nonsmoker       Medium    Aortic stenosis    Osteoarthritis of both knees       Low    ED (erectile dysfunction)    HLD (hyperlipidemia)       Unprioritized    Chronic iritis of right eye    CVA (cerebral vascular accident) (H)    Dysphagia as late effect of cerebrovascular accident (CVA)    Hemiplegia as late effect of cerebrovascular accident (CVA) (H)    Obesity (BMI  35.0-39.9) with comorbidity (H)           Current Outpatient Medications   Medication Sig   ? acetaminophen (TYLENOL) 500 MG tablet Take 1,000 mg by mouth every 6 (six) hours as needed for pain.   ? amLODIPine (NORVASC) 10 MG tablet Take 10 mg by mouth.   ? aspirin 81 MG EC tablet Take 81 mg by mouth daily.   ? atorvastatin (LIPITOR) 80 MG tablet Take 80 mg by mouth.   ? cholecalciferol, vitamin D3, 2,000 unit Tab Take by mouth.   ? cyanocobalamin 1000 MCG tablet Take 1,000 mcg by mouth.   ? mirtazapine (REMERON) 7.5 MG tablet Take 15 mg by mouth.   ? polyethylene glycol (MIRALAX) 17 gram packet Take 17 g by mouth daily.   ? senna-docusate (PERICOLACE) 8.6-50 mg tablet Take 2 tablets by mouth.     Social History     Social History Narrative    Patient of Dr. Chen since 2015.  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        [employer] in [place].       Subjective:     Patient comes in today with his wife.    The patient is a long-term patient of mine.  He is coming in today as a courtesy to update me on his situation and go through his issues.    He has been at [facility] for quite a while now doing rehab.  These notes were reviewed in care everywhere.    He had a dense stroke of his right cerebrum in the MCA territory.  He had lytic and interventional therapy related to this.  He has not really recovered well from this.  He still has a lot of weakness on the left side.  His speech is not affected.  He has been doing ongoing therapy and making some progress.    He is on anticoagulants related to this.  He is on Eliquis.  There is a plan to fix his aortic valve here in the near future at [facility].    In relationship to the aortic stenosis, there is speculation that the stroke was caused by this.  He has no evidence of a.  Fib.  He will be having his surgery at [facility].    He apparently sustained a fracture of his glenoid and his greater tuberosity.  His pain is doing better related to this.  He can use a sling  for this.  He has also had some left wrist pain but apparently his x-ray here look good.  I do not have these records for my review.    We reviewed his other issues noted in the assessment but not specifically addressed in the HPI above.     Past medical, family and social history reviewed today.     Comprehensive review of systems was performed today with no major problems noted except as above.     Objective:     Wt Readings from Last 3 Encounters:   06/07/19 (!) 239 lb 11.2 oz (108.7 kg)   06/05/18 (!) 238 lb 12.8 oz (108.3 kg)   06/06/17 (!) 250 lb 6.4 oz (113.6 kg)     BP Readings from Last 3 Encounters:   12/09/19 122/64   06/07/19 132/74   06/05/18 124/80     /64   Pulse (!) 101   SpO2 95%    The patient is comfortable, no acute distress.  Mood slightly down to good.  Insight good.  Speech is fluent.  Eyes are nonicteric.  Neck is supple without mass.  No cervical adenopathy.  No thyromegaly. Heart regular rate and rhythm.  Her murmur is stable.  Lungs clear to auscultation bilaterally.  Respiratory effort is good.  Abdomen soft and nontender.  No hepatosplenomegaly.  Extremities no edema.  Dense left hemiplegia especially in the left upper arm.  There is no swelling of the left wrist noted.      Diagnostics:     Results for orders placed or performed in visit on 12/03/19   Basic Metabolic Panel   Result Value Ref Range    Sodium 141 136 - 145 mmol/L    Potassium 3.6 3.5 - 5.0 mmol/L    Chloride 105 98 - 107 mmol/L    CO2 26 22 - 31 mmol/L    Anion Gap, Calculation 10 5 - 18 mmol/L    Glucose 141 (H) 70 - 125 mg/dL    Calcium 9.8 8.5 - 10.5 mg/dL    BUN 10 8 - 22 mg/dL    Creatinine 0.98 0.70 - 1.30 mg/dL    GFR MDRD Af Amer >60 >60 mL/min/1.73m2    GFR MDRD Non Af Amer >60 >60 mL/min/1.73m2   C-Reactive Protein (CRP)   Result Value Ref Range    CRP 0.4 0.0 - 0.8 mg/dL   Electrophoresis, Protein, Serum   Result Value Ref Range    Albumin % 60.3 51.0 - 67.0 %    Albumin  4.1 3.2 - 4.7 g/dL    Alpha 1 %  2.0 2.0 - 4.0 %    Alpha 1 0.1 0.1 - 0.3 g/dL    Alpha 2 % 12.9 5.0 - 13.0 %    Alpha 2 0.9 0.4 - 0.9 g/dL    % Beta 14.1 10.0 - 17.0 %    Beta 1.0 0.7 - 1.2 g/dL    Gamma Globulin % 10.7 9.0 - 20.0 %    Gamma Globulin 0.7 0.6 - 1.4 g/dL    ELP Comment Unremarkable protein electrophoresis.       Protein, Total 6.8 6.0 - 8.0 g/dL    Path ICD: M25.532     Interpreted By: Natalio Vicente MD    HM1 (CBC with Diff)   Result Value Ref Range    WBC 12.3 (H) 4.0 - 11.0 thou/uL    RBC 4.90 4.40 - 6.20 mill/uL    Hemoglobin 15.4 14.0 - 18.0 g/dL    Hematocrit 45.6 40.0 - 54.0 %    MCV 93 80 - 100 fL    MCH 31.4 27.0 - 34.0 pg    MCHC 33.8 32.0 - 36.0 g/dL    RDW 13.7 11.0 - 14.5 %    Platelets 296 140 - 440 thou/uL    MPV 11.5 8.5 - 12.5 fL    Neutrophils % 64 50 - 70 %    Lymphocytes % 27 20 - 40 %    Monocytes % 6 2 - 10 %    Eosinophils % 3 0 - 6 %    Basophils % 1 0 - 2 %    Neutrophils Absolute 7.8 (H) 2.0 - 7.7 thou/uL    Lymphocytes Absolute 3.3 0.8 - 4.4 thou/uL    Monocytes Absolute 0.8 0.0 - 0.9 thou/uL    Eosinophils Absolute 0.3 0.0 - 0.4 thou/uL    Basophils Absolute 0.1 0.0 - 0.2 thou/uL       Assessment:     1. Nonrheumatic aortic valve stenosis    2. Hemiplegia as late effect of cerebrovascular accident (CVA) (H)    3. Primary osteoarthritis of both knees    4. Dysphagia as late effect of cerebrovascular accident (CVA)    5. Cerebrovascular accident (CVA) due to embolism of right middle cerebral artery (H)    6. Obesity (BMI 35.0-39.9) with comorbidity (H)    7. Mixed hyperlipidemia    8. Glenoid fracture of shoulder, left, closed, initial encounter    9. Closed nondisplaced fracture of greater tuberosity of left humerus, initial encounter    10. Left wrist pain        Quality review:     PHQ-2 Total Score: 0 (6/7/2019  9:28 AM)    No data recorded  ______________________________________________________________________     BMI Readings from Last 1 Encounters:   06/07/19 35.14 kg/m        Plan:      1. Continue therapies at [facility].  2. Follow-up with [facility] as ordered.  3. Consider orthopedic referral for the shoulder if he is worsening.  He is currently comfortable with the plan of care.  4. Continue current medications as is.  5. I can see him anytime in the future as needed, but right now he has a number of appointments in place for eventual replacement of his valves.  6. He should follow-up sooner if issues.         Juan Francisco Chen MD  General Internal Medicine  [facility]    Personal office fax - [phone]   Voice mail - [phone]  E-mail - [email or web address]      Return in about 6 months (around 6/9/2020).     No future appointments.      ______________________________________________________________________     Relevant ICD-10 codes and order associations:      ICD-10-CM    1. Nonrheumatic aortic valve stenosis I35.0    2. Hemiplegia as late effect of cerebrovascular accident (CVA) (H) I69.359    3. Primary osteoarthritis of both knees M17.0    4. Dysphagia as late effect of cerebrovascular accident (CVA) I69.391    5. Cerebrovascular accident (CVA) due to embolism of right middle cerebral artery (H) I63.411    6. Obesity (BMI 35.0-39.9) with comorbidity (H) E66.01    7. Mixed hyperlipidemia E78.2    8. Glenoid fracture of shoulder, left, closed, initial encounter S42.142A     S42.152A    9. Closed nondisplaced fracture of greater tuberosity of left humerus, initial encounter S42.255A    10. Left wrist pain M25.532

## 2021-06-29 NOTE — PROGRESS NOTES
Progress Notes by Juan Francisco Chen MD at 2020  8:50 AM     Author: Juan Francisco Chen MD Service: -- Author Type: Physician    Filed: 2020  1:51 PM Encounter Date: 2020 Status: Signed    : Juan Francisco Chen MD (Physician)              [facility]    Comprehensive and complex medical care - Chronic disease management - Shared decision making - Care coordination - Compassionate care    Patient advocacy - Rational deprescribing - Minimally disruptive medicine - Ethical focus - Customized care          Date of Service: 2020  Primary Provider: Juan Francisco Chen MD    Patient Care Team:  Juan Francisco Chen MD as PCP - General (Internal Medicine)  Rocco Solis MD as Physician (Dermatology)  Juan Francisco Chen MD as Assigned PCP     ______________________________________________________________________     Patient's Pharmacy:      [facility] [address] [place] [address] [address] [facility]  [address] [address]  Phone: [phone] Fax: [phone]     Patient's Contacts:  Name Home Phone Work Phone Mobile Phone Relationship Lgl VERONA Ireland [phone]   Spouse        Patient's Insurance:    Payor/Plan Subscr  Sex Relation Sub. Ins. ID Effective Group Num   1. MEDICARE - [place]* AGUILAR GARCIA* 1950 Male  [id number] Not Eff                                    [place], [address]   2. [employer]* AGUILAR GARCIA* 1950 Male  [id number] 1/1/15                                    [employer]       Subjective:     History of present illness:    Aguilar Garcia is an 70 y.o. male here for an annual wellness visit.    The issues he would like to address at today's visit include the following:    Chief Complaint   Patient presents with   ? Annual Wellness Visit      In for follow up multiple issues.    Cerebrovascular accident (stroke) is the main issue.  Able to ambulate with a cane at this time but  limited.  Left upper extremity function is present but minimal.  Almost 11 months from his cerebrovascular accident (stroke).    Aortic valve replacement (AVR) seems to be doing well.  Need antibiotics prophylaxis for dental work and this was done today.    Left shoulder and elbow and wrist doing okay at this time.    Bilateral knee pain and osteoarthritis (OA) manageable at this time.    Depression not helped with any medication so far.  They would like to monitor off the medications at this time.      Constipation is an issue and we reviewed Miralax again with them.    We reviewed his other issues noted in the assessment but not specifically addressed in the HPI above.     Review of Systems:  The 10-system review of systems and health history form for [facility] was completed by patient, reviewed by me, and pertinent problems are reviewed above.     ______________________________________________________________________     Active Problem List:  Problem List as of 7/31/2020 Reviewed: 3/30/2020  2:01 PM by Juan Francisco Chen MD       High    CVA (cerebral vascular accident) (H)    Full code status    Nonsmoker    S/P aortic valve replacement with bioprosthetic valve       Medium    Osteoarthritis of both knees       Low    ED (erectile dysfunction)    HLD (hyperlipidemia)       Unprioritized    Chronic iritis of right eye    Dysphagia as late effect of cerebrovascular accident (CVA)    Hemiplegia as late effect of cerebrovascular accident (CVA) (H)    Obesity (BMI 35.0-39.9) with comorbidity (H)           Past Medical History:   Diagnosis Date   ? Aortic stenosis    ? BPH (benign prostatic hyperplasia) 8/2/2020   ? ED (erectile dysfunction)    ? H/O iritis    ? HLD (hyperlipidemia)    ? Nonsmoker    ? Osteoarthritis of both knees       Past Surgical History:   Procedure Laterality Date   ? HAND SURGERY     ? KNEE ARTHROSCOPY        Family History   Problem Relation Age of Onset   ? Heart failure Mother    ? Rheum  arthritis Mother    ? Other Mother         Temporal arteritis   ? Heart attack Father    ? No Medical Problems Daughter    ? No Medical Problems Daughter    ? Melanoma Son      Family history is otherwise noncontributory.     Social History     Occupational History   ? Occupation: Owns own business   Tobacco Use   ? Smoking status: Never Smoker   ? Smokeless tobacco: Never Used   Substance and Sexual Activity   ? Alcohol use: Yes   ? Drug use: Not on file   ? Sexual activity: Not on file      Social History     Social History Narrative    Patient of Dr. Chen since 2015.  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        [employer] in [place].      Current Outpatient Medications   Medication Sig   ? aspirin 81 MG EC tablet Take 81 mg by mouth daily.   ? atorvastatin (LIPITOR) 80 MG tablet Take 1 tablet (80 mg total) by mouth daily.   ? cholecalciferol, vitamin D3, 2,000 unit Tab Take by mouth.   ? cyanocobalamin 1000 MCG tablet Take 1,000 mcg by mouth.   ? FLUoxetine (PROZAC) 20 MG capsule Take 1 capsule (20 mg total) by mouth daily.   ? tamsulosin (FLOMAX) 0.4 mg cap Take 1 capsule (0.4 mg total) by mouth daily after supper.   ? amoxicillin (AMOXIL) 500 MG capsule Take 4 capsules (2,000 mg total) by mouth once as needed (take 1 hour before dental work.).   ? metoprolol succinate (TOPROL XL) 25 MG Take 1 tablet (25 mg total) by mouth daily.      Allergies: Sulfa (sulfonamide antibiotics)     Immunization History   Administered Date(s) Administered   ? Influenza high dose,seasonal,PF, 65+ yrs 10/07/2015, 10/20/2016   ? Influenza, inj, historic,unspecified 10/08/2015, 10/01/2018   ? Influenza,seasonal quad, PF, =/> 6months 09/29/2019   ? Influenza,trivalent,im, 65+yrs 10/09/2017   ? Pneumo Conj 13-V (2010&after) 10/07/2015   ? Pneumo Polysac 23-V 10/01/2014   ? Td, adult adsorbed, PF 07/31/2020   ? Td,adult,historic,unspecified 11/05/2010   ? ZOSTER, LIVE 11/05/2010      Objective:     Visit Vitals  /66   Pulse 80   Ht 5'  "9.5\" (1.765 m)   Wt 179 lb (81.2 kg)   SpO2 95%   BMI 26.05 kg/m       Wt Readings from Last 3 Encounters:   07/31/20 179 lb (81.2 kg)   06/07/19 (!) 239 lb 11.2 oz (108.7 kg)   06/05/18 (!) 238 lb 12.8 oz (108.3 kg)     BP Readings from Last 3 Encounters:   07/31/20 118/66   03/27/20 118/72   12/09/19 122/64     Vision Screening:  No exam data present     Roomed by: logan rm    Accompanied by Spouse    Refills needed? No    Do you have any forms that need to be filled out? No      Body mass index is 26.05 kg/m .     PHYSICAL EXAM  The patient is comfortable, no acute distress.  Mood flat but improved.  Insight is good.  No skin lesions or nodules of concern.  Ears clear.  Eyes are nonicteric.  Pupils equal and reactive.  Throat is clear.  Neck is supple without mass, no thyromegaly. No cervical or epitrochlear adenopathy.  Heart regular rate and rhythm.  Lungs clear to auscultation bilaterally.  Respiratory effort good.  Abdomen soft and nontender.  No hepatosplenomegaly.  No aortic aneurysm detected.  No hernia noted.  Extremities show no edema. His gait is slow and shuffling due to weakness on the left.  Left arm function is minimal.     Assessment Results 7/31/2020   Activities of Daily Living 2-4 - Moderate impairment   Instrumental Activities of Daily Living 5-6 - Severe functional impairment   Get Up and Go Score (No Data)   Mini Cog Total Score 5   Some recent data might be hidden     A Mini-Cog score of 0-2 suggests the possibility of dementia, score of 3-5 suggests no dementia    Diagnostics:     Recent Results (from the past 240 hour(s))   Comprehensive Metabolic Panel   Result Value Ref Range    Sodium 141 136 - 145 mmol/L    Potassium 4.1 3.5 - 5.0 mmol/L    Chloride 105 98 - 107 mmol/L    CO2 26 22 - 31 mmol/L    Anion Gap, Calculation 10 5 - 18 mmol/L    Glucose 116 70 - 125 mg/dL    BUN 13 8 - 28 mg/dL    Creatinine 1.04 0.70 - 1.30 mg/dL    GFR MDRD Af Amer >60 >60 mL/min/1.73m2    GFR MDRD Non Af " Amer >60 >60 mL/min/1.73m2    Bilirubin, Total 0.8 0.0 - 1.0 mg/dL    Calcium 9.8 8.5 - 10.5 mg/dL    Protein, Total 7.2 6.0 - 8.0 g/dL    Albumin 4.3 3.5 - 5.0 g/dL    Alkaline Phosphatase 114 45 - 120 U/L    AST 18 0 - 40 U/L    ALT 16 0 - 45 U/L   HM2(CBC w/o Differential)   Result Value Ref Range    WBC 9.6 4.0 - 11.0 thou/uL    RBC 5.16 4.40 - 6.20 mill/uL    Hemoglobin 16.1 14.0 - 18.0 g/dL    Hematocrit 46.9 40.0 - 54.0 %    MCV 91 80 - 100 fL    MCH 31.2 27.0 - 34.0 pg    MCHC 34.3 32.0 - 36.0 g/dL    RDW 13.1 11.0 - 14.5 %    Platelets 177 140 - 440 thou/uL    MPV 9.0 7.0 - 10.0 fL   Lipid Cascade FASTING   Result Value Ref Range    Cholesterol 115 <=199 mg/dL    Triglycerides 116 <=149 mg/dL    HDL Cholesterol 42 >=40 mg/dL    LDL Calculated 50 <=129 mg/dL    Patient Fasting > 8hrs? Yes    Glycosylated Hemoglobin A1c   Result Value Ref Range    Hemoglobin A1c 5.1 3.5 - 6.0 %        Quality review:     PHQ-2 Total Score: 6 (7/31/2020  9:00 AM)  Depression Follow-up Plan: patient follow-up to return when and if necessary (7/31/2020  9:00 AM)      PHQ-9 Total Score: 11 (7/31/2020  9:00 AM)    ______________________________________________________________________     BMI Readings from Last 1 Encounters:   07/31/20 26.05 kg/m        Assessment:     1. Medicare annual wellness visit, subsequent    2. Cerebrovascular accident (CVA) due to embolism of right middle cerebral artery (H)    3. S/P aortic valve replacement with bioprosthetic valve    4. Mixed hyperlipidemia    5. Essential hypertension    6. Elevated blood sugar    7. Primary osteoarthritis of both knees    8. Chronic iritis of right eye    9. Dysphagia as late effect of cerebrovascular accident (CVA)    10. Hemiplegia as late effect of cerebrovascular accident (CVA) (H)    11. Moderate major depression (H)    12. Constipation, unspecified constipation type    13. Benign prostatic hyperplasia with urinary obstruction         Plan:     1. Follow up next  May.  2. Check blood work today.  See relevant orders and diagnosis associations at the bottom of this note.   3. Try off depression medications.  Consider counseling if desired.  4. Continue other medications.  5. Miralax for constipation.  6. Plan to go to [place] this fall and winter.  7. Follow up sooner if issues.  8. Td done today.         Juan Francisco Chen MD  General Internal Medicine  [facility]    Personal office fax - [phone]   Voice mail - [phone]  E-mail - [email or web address]     Return in about 9 months (around 5/1/2021) for visit and blood work, follow up visit.     No future appointments.      ______________________________________________________________________     Relevant ICD-10 codes and order associations:      ICD-10-CM    1. Medicare annual wellness visit, subsequent  Z00.00    2. Cerebrovascular accident (CVA) due to embolism of right middle cerebral artery (H)  I63.411 metoprolol succinate (TOPROL XL) 25 MG   3. S/P aortic valve replacement with bioprosthetic valve  Z95.3 amoxicillin (AMOXIL) 500 MG capsule   4. Mixed hyperlipidemia  E78.2 Comprehensive Metabolic Panel     Lipid Cascade FASTING   5. Essential hypertension  I10 Comprehensive Metabolic Panel     HM2(CBC w/o Differential)   6. Elevated blood sugar  R73.9 Glycosylated Hemoglobin A1c   7. Primary osteoarthritis of both knees  M17.0    8. Chronic iritis of right eye  H20.11    9. Dysphagia as late effect of cerebrovascular accident (CVA)  I69.391    10. Hemiplegia as late effect of cerebrovascular accident (CVA) (H)  I69.359    11. Moderate major depression (H)  F32.1    12. Constipation, unspecified constipation type  K59.00    13. Benign prostatic hyperplasia with urinary obstruction  N40.1     N13.8         Identified Health Risks:     A personalized health plan based on the identified health risks was provided to the patient on the  AVS.    ______________________________________________________________________     The following health maintenance schedule was reviewed with the patient and provided in printed form in the after visit summary:     Health Maintenance   Topic Date Due   ? ZOSTER VACCINES (2 of 3) 12/31/2010   ? MEDICARE ANNUAL WELLNESS VISIT  06/07/2020   ? INFLUENZA VACCINE RULE BASED (1) 08/01/2020   ? FALL RISK ASSESSMENT  07/31/2021   ? ADVANCE CARE PLANNING  06/07/2024   ? LIPID  07/31/2025   ? COLORECTAL CANCER SCREENING  06/01/2027   ? TD 18+ HE  07/31/2030   ? DEPRESSION ACTION PLAN  Completed   ? PNEUMOCOCCAL IMMUNIZATION 65+ LOW/MEDIUM RISK  Completed   ? HEPATITIS B VACCINES  Aged Out   ? HEPATITIS C SCREENING  Discontinued        ______________________________________________________________________   He is at risk for lack of exercise and has been provided with information to increase physical activity for the benefit of his well-being.  The patient reports that he has difficulty with activities of daily living. I have asked that the patient make a follow up appointment in 40 weeks where this issue will be further evaluated and addressed.  The patient reports that he has difficulty with instrumental activities of daily living.  He was provided with a list of local organizations that provide support services and advised to make a follow up appointment in 40 weeks to address this further.   Information on urinary incontinence and treatment options given to patient.  The patient was provided with suggestions to help him develop a healthy emotional lifestyle.   The patients PHQ-9 score is consistent with moderate depression.  He was provided with information regarding depression and was advised to schedule a follow up appointment in 40 weeks to further address this issue.  He is at risk for falling and has been provided with information to reduce the risk of falling at home.  Patient's advanced directive was discussed and I  am comfortable with the patient's wishes.

## 2021-06-30 NOTE — PROGRESS NOTES
Progress Notes by Juan Francisco Chen MD at 2021  9:55 AM     Author: Juan Francisco Chen MD Service: -- Author Type: Physician    Filed: 2021 12:39 PM Encounter Date: 2021 Status: Signed    : Juan Francisco Chen MD (Physician)       Wt Readings from Last 20 Encounters:   21 166 lb (75.3 kg)   20 179 lb (81.2 kg)   19 (!) 239 lb 11.2 oz (108.7 kg)   18 (!) 238 lb 12.8 oz (108.3 kg)   17 (!) 250 lb 6.4 oz (113.6 kg)   16 (!) 251 lb 12.8 oz (114.2 kg)   06/29/15 (!) 250 lb 9.6 oz (113.7 kg)       ______________________________________________________________________            [facility]    Comprehensive and complex medical care - Chronic disease management - Shared decision making - Care coordination - Compassionate care    Patient advocacy - Rational deprescribing - Minimally disruptive medicine - Ethical focus - Customized care          Date of Service: 2021  Primary Provider: Juan Francisco Chen MD    Patient Care Team:  Juan Francisco Chen MD as PCP - General (Internal Medicine)  Rocco Solis MD as Physician (Dermatology)  Juan Francisco Chen MD as Assigned PCP     ______________________________________________________________________     Patient's Pharmacy:    [facility] [address] [place] [address] - [address] [facility]  [address] [facility] [address]  Phone: [phone] Fax: [phone]    [facility] - [place], [place] - [address]  [address] [facility] [address]  Phone: [phone] Fax: [phone]     Patient's Contacts:  Name Home Phone Work Phone Mobile Phone Relationship Lgl Grd   SHELTONFABRIZIO TONGAN [phone]   Spouse      Patient's Insurance:    Payor/Plan Subscr  Sex Relation Sub. Ins. ID Effective Group Num   1. MEDICARE - ME* NATALIE MONROY* 1950 Male  [id number] Not Eff                                    [place], [address]   2. [employer]*  NATALIE MONROY* 1950 Male Self [id number] 1/1/15                                    [facility] BANDAR Sheikh SANJAY Monroy is a 71 y.o. male who comes in today for:    Chief Complaint   Patient presents with   ? Follow-up     stroke, medications       Active Problem List:  Problem List as of 5/24/2021 Reviewed: 8/2/2020  1:51 PM by Juan Francisco Chen MD       High    CVA (cerebral vascular accident) (H)    Full code status    Nonsmoker    S/P aortic valve replacement with bioprosthetic valve       Medium    Osteoarthritis of both knees       Low    BPH (benign prostatic hyperplasia)    ED (erectile dysfunction)    HLD (hyperlipidemia)       Unprioritized    Moderate major depression (H)    Chronic iritis of right eye    Constipation, unspecified constipation type    Dysphagia as late effect of cerebrovascular accident (CVA)    Hemiplegia as late effect of cerebrovascular accident (CVA) (H)           Current Outpatient Medications   Medication Sig   ? amoxicillin (AMOXIL) 500 MG capsule Take 4 capsules (2,000 mg total) by mouth once as needed (take 1 hour before dental work.).   ? aspirin 81 MG EC tablet Take 81 mg by mouth daily.   ? atorvastatin (LIPITOR) 80 MG tablet TAKE 1 TABLET (80 MG TOTAL) BY MOUTH DAILY.   ? cholecalciferol, vitamin D3, 2,000 unit Tab Take by mouth.   ? cyanocobalamin 1000 MCG tablet Take 1,000 mcg by mouth.   ? metoprolol succinate (TOPROL XL) 25 MG Take 1 tablet (25 mg total) by mouth daily.   ? tamsulosin (FLOMAX) 0.4 mg cap TAKE 1 CAPSULE (0.4 MG TOTAL) BY MOUTH DAILY AFTER SUPPER.       Social History     Social History Narrative    Patient of Dr. Chen since 2015.  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        [employer] in [place].       Subjective:     Patient comes in today to review his health.    We reviewed a number of issues.    We reviewed his stroke.  He still has significant loss of use of his left arm.  He is able to walk with a cane in his house and the leg is  overall doing okay.  Its stiff in somewhat weak at times but overall doing okay.  He did work with a  in relationship to this as well.  We talked about repeating physical therapy for this and this could be done in the future if needed.    We reviewed his bowels next.  His bowels have not been very regular.  He has used MiraLAX or Senokot on a as needed basis.  He is worried about it.  It is difficult for him to get it to start.  He might go every 2 to 3 days at best.  It has been an issue since his stroke.    Reviewed his aortic valve replacement.  His valve has been doing well.  He has no cardiac symptoms.    We reviewed his BPH and he does have some issues with getting his urine to start.    Reviewed his osteoarthritis of his knees and he has been managing well with this.  Because he is not on his feet as much he has not had as much pain with them.    He did get 2 shots of Covid vaccine.    He does have dry skin in his arms and legs but also notes a burning sensation at times in the skin.  This is been more recent.    He sustained a fall a few days back by tripping over a threshold.  He did hurt the right backside of his head a little bit at that time.    He has some concerns about his bathtub and safety issues with this.    We reviewed his other issues noted in the assessment but not specifically addressed in the HPI above.     Objective:     Wt Readings from Last 3 Encounters:   05/24/21 166 lb (75.3 kg)   07/31/20 179 lb (81.2 kg)   06/07/19 (!) 239 lb 11.2 oz (108.7 kg)     BP Readings from Last 3 Encounters:   05/24/21 116/70   07/31/20 118/66   03/27/20 118/72     /70   Pulse 86   Wt 166 lb (75.3 kg)   SpO2 97%   BMI 24.16 kg/m     The patient is comfortable, no acute distress.  Mood good.  Insight good.  Eyes are nonicteric.  Neck is supple without mass.  No cervical adenopathy.  No thyromegaly. Heart regular rate and rhythm.  Lungs clear to auscultation bilaterally.  Respiratory  effort is good.  Abdomen soft and nontender.  No hepatosplenomegaly.  Extremities no edema.      Diagnostics:     Results for orders placed or performed in visit on 05/24/21   HM2(CBC w/o Differential)   Result Value Ref Range    WBC 9.3 4.0 - 11.0 thou/uL    RBC 5.22 4.40 - 6.20 mill/uL    Hemoglobin 16.1 14.0 - 18.0 g/dL    Hematocrit 47.6 40.0 - 54.0 %    MCV 91 80 - 100 fL    MCH 30.8 27.0 - 34.0 pg    MCHC 33.8 32.0 - 36.0 g/dL    RDW 13.3 11.0 - 14.5 %    Platelets 207 140 - 440 thou/uL    MPV 10.5 (H) 7.0 - 10.0 fL   Comprehensive Metabolic Panel   Result Value Ref Range    Sodium 143 136 - 145 mmol/L    Potassium 4.3 3.5 - 5.0 mmol/L    Chloride 108 (H) 98 - 107 mmol/L    CO2 23 22 - 31 mmol/L    Anion Gap, Calculation 12 5 - 18 mmol/L    Glucose 112 70 - 125 mg/dL    BUN 14 8 - 28 mg/dL    Creatinine 1.05 0.70 - 1.30 mg/dL    GFR MDRD Af Amer >60 >60 mL/min/1.73m2    GFR MDRD Non Af Amer >60 >60 mL/min/1.73m2    Bilirubin, Total 0.8 0.0 - 1.0 mg/dL    Calcium 9.4 8.5 - 10.5 mg/dL    Protein, Total 6.8 6.0 - 8.0 g/dL    Albumin 4.0 3.5 - 5.0 g/dL    Alkaline Phosphatase 101 45 - 120 U/L    AST 17 0 - 40 U/L    ALT 16 0 - 45 U/L   Lipid Cascade FASTING   Result Value Ref Range    Cholesterol 125 <=199 mg/dL    Triglycerides 99 <=149 mg/dL    HDL Cholesterol 42 >=40 mg/dL    LDL Calculated 63 <=129 mg/dL    Patient Fasting > 8hrs? Unknown    Thyroid Stimulating Hormone (TSH)   Result Value Ref Range    TSH 1.66 0.30 - 5.00 uIU/mL   PSA, Annual Screen (Prostatic-Specific Antigen)   Result Value Ref Range    PSA 3.0 0.0 - 6.5 ng/mL   Vitamin B12   Result Value Ref Range    Vitamin B-12 558 213 - 816 pg/mL       Quality review:     PHQ-2 Total Score: 2 (5/24/2021 10:00 AM)  Depression Follow-up Plan: patient follow-up to return when and if necessary (7/31/2020  9:00 AM)    PHQ-9 Total Score: 9 (5/24/2021 10:00 AM)    ______________________________________________________________________     BMI Readings from Last  1 Encounters:   05/24/21 24.16 kg/m        Assessment and Plan:     1. Cerebrovascular accident (CVA) due to embolism of right middle cerebral artery (H)  Check blood work and see how this looks.  Continue to monitor.  Could consider physical therapy at [facility] if he wishes this summer.    - Comprehensive Metabolic Panel  - Lipid Cascade FASTING    2. Hemiplegia as late effect of cerebrovascular accident (CVA) (H)  Still issues with this and some issues with stability but overall doing about the same.  Continue to monitor.    3. Moderate major depression (H)  Monitor off of medication.  Likely due to underlying disease issues.    4. S/P aortic valve replacement with bioprosthetic valve  Valve sounds great and seems to be doing very well.    - amoxicillin (AMOXIL) 500 MG capsule; Take 4 capsules (2,000 mg total) by mouth once as needed (take 1 hour before dental work.).  Dispense: 16 capsule; Refill: 2  - HM2(CBC w/o Differential)    5. Weight loss  Weight loss noted and his wife says his intake this is not the same and motivations not the same.  Monitor at this point.    - Thyroid Stimulating Hormone (TSH)    6. Idiopathic progressive neuropathy    - Vitamin B12    7. Screening for prostate cancer    - PSA, Annual Screen (Prostatic-Specific Antigen)    8. Primary osteoarthritis of both knees  No obvious issue with this at this time.  Continue to monitor.    9. Mixed hyperlipidemia  Cholesterol doing well.    10. Constipation, unspecified constipation type  Try using MiraLAX daily at this time and see how this goes.    11. Burning sensation of skin  Nondescript.  Cannot rule out a nerve possible issue.    12. Benign prostatic hyperplasia with urinary obstruction  Continue current medications at this time.     40 minutes or greater was spent today on the patient's care on the day of service.      This includes time for chart preparation, reviewing medical tests done before or during the visit, talking with the  patient, review of quality indicators, required documentation, and other elements of care.        Juan Francisco Chen MD  General Internal Medicine  [facility]      Return in about 1 year (around 5/24/2022), or if symptoms worsen or fail to improve, for visit and blood work, annual wellness visit.     No future appointments.      HCC issues resolved at this visit.

## 2021-07-04 ENCOUNTER — COMMUNICATION - HEALTHEAST (OUTPATIENT)
Dept: INTERNAL MEDICINE | Facility: CLINIC | Age: 71
End: 2021-07-04

## 2021-07-04 DIAGNOSIS — I63.411 CEREBROVASCULAR ACCIDENT (CVA) DUE TO EMBOLISM OF RIGHT MIDDLE CEREBRAL ARTERY (H): ICD-10-CM

## 2021-07-04 RX ORDER — METOPROLOL SUCCINATE 25 MG/1
25 TABLET, EXTENDED RELEASE ORAL DAILY
Qty: 90 TABLET | Refills: 2 | Status: SHIPPED | OUTPATIENT
Start: 2021-07-04 | End: 2022-05-05

## 2021-07-04 NOTE — TELEPHONE ENCOUNTER
Telephone Encounter by Corrie Nuñez RN at 7/4/2021 11:30 AM     Author: Corrie Nuñez RN Service: -- Author Type: Registered Nurse    Filed: 7/4/2021 11:31 AM Encounter Date: 7/3/2021 Status: Signed    : Corrie Nuñez RN (Registered Nurse)       Refill Approved    Rx renewed per Medication Renewal Policy. Medication was last renewed on 7/31/20, last OV 5/24/21.    Corrie Nuñez, [facility] Triage/Med Refill 7/4/2021     Requested Prescriptions   Pending Prescriptions Disp Refills   ? metoprolol succinate (TOPROL-XL) 25 MG [Pharmacy Med Name: METOPROLOL SUCCINATE ER 25 MG Tablet Extended Release 24 Hour] 90 tablet 3     Sig: TAKE 1 TABLET (25 MG TOTAL) BY MOUTH DAILY.       Beta-Blockers Refill Protocol Passed - 7/3/2021 11:05 AM        Passed - PCP or prescribing provider visit in past 12 months or next 3 months     Last office visit with prescriber/PCP: 5/24/2021 Juan Francisco Chen MD OR same dept: 5/24/2021 Juan Francisco Chen MD OR same specialty: 5/24/2021 Juan Francisco Chen MD  Last physical: 7/31/2020 Last MTM visit: Visit date not found   Next visit within 3 mo: Visit date not found  Next physical within 3 mo: Visit date not found  Prescriber OR PCP: Juan Francisco Chen MD  Last diagnosis associated with med order: 1. Cerebrovascular accident (CVA) due to embolism of right middle cerebral artery (H)  - metoprolol succinate (TOPROL-XL) 25 MG [Pharmacy Med Name: METOPROLOL SUCCINATE ER 25 MG Tablet Extended Release 24 Hour]; Take 1 tablet (25 mg total) by mouth daily.  Dispense: 90 tablet; Refill: 3    If protocol passes may refill for 12 months if within 3 months of last provider visit (or a total of 15 months).             Passed - Blood pressure filed in past 12 months     BP Readings from Last 1 Encounters:   05/24/21 116/70

## 2021-07-04 NOTE — LETTER
Letter by Juan Francisco Chen MD at      Author: Juan Francisco Chen MD Service: -- Author Type: --    Filed:  Encounter Date: 2021 Status: (Other)         2021    Aguilar Garcia   [address] [place] [address]   : 1950       Patient prescription:      RX:  Physical therapy evaluation and treat.  At [facility].  To focus mostly on gait and lower extremities.    DX:      ICD-10-CM    1. Cerebrovascular accident (CVA) due to embolism of right middle cerebral artery (H)  I63.411    2. Hemiplegia as late effect of cerebrovascular accident (CVA) (H)  I69.359    3. Left leg weakness  R29.898    4. Gait instability  R26.81           Juan Francisco Chen MD  General Internal Medicine  [facility] [id number]    [place] License [id number]     Patient Information    Patient Name   Aguilar Garcia ([id number]) Sex   Male    1950   Patient Language     Needed: No                           Spoken Language: English                           Written Language: English          Patient Demographics    Address   [id number] [place] [address] Phone   [phone] (Home)   [phone] (Mobile) *Preferred* E-mail Address   [email or web address]   Currently Active Insurance    Payor Plan Ins. Group Subscriber Member ID   [employer] [place] [id number]   MEDICARE MEDICARE A AND B  AGUILAR GARCIA SANJAY [id number]   PCP and Center    Primary Care Provider Phone [place]   Juan Francisco Chen [phone] [facility]   Emergency Contact(s)    Name Relation Home Work Mobile   Olivia Garcia Spouse [phone]

## 2021-07-06 VITALS
BODY MASS INDEX: 24.16 KG/M2 | DIASTOLIC BLOOD PRESSURE: 70 MMHG | HEART RATE: 86 BPM | SYSTOLIC BLOOD PRESSURE: 116 MMHG | OXYGEN SATURATION: 97 % | WEIGHT: 166 LBS

## 2021-07-06 ASSESSMENT — PATIENT HEALTH QUESTIONNAIRE - PHQ9: SUM OF ALL RESPONSES TO PHQ QUESTIONS 1-9: 9

## 2021-08-03 PROBLEM — E66.01 MORBID OBESITY (H): Status: RESOLVED | Noted: 2019-06-07 | Resolved: 2020-08-02

## 2021-08-06 ENCOUNTER — TRANSFERRED RECORDS (OUTPATIENT)
Dept: HEALTH INFORMATION MANAGEMENT | Facility: CLINIC | Age: 71
End: 2021-08-06

## 2021-08-06 NOTE — PATIENT INSTRUCTIONS - HE
Patient Instructions by Juan Francisco Chen MD at 7/31/2020  8:50 AM     Author: Juan Francisco Chen MD Service: -- Author Type: Physician    Filed: 8/2/2020  1:43 PM Encounter Date: 7/31/2020 Status: Signed    : Juan Francisco Chen MD (Physician)         Patient Education     Exercise for a Healthier Heart  You may wonder how you can improve the health of your heart. If youre thinking about exercise, youre on the right track. You dont need to become an athlete, but you do need a certain amount of brisk exercise to help strengthen your heart. If you have been diagnosed with a heart condition, your doctor may recommend exercise to help stabilize your condition. To help make exercise a habit, choose safe, fun activities.       Be sure to check with your health care provider before starting an exercise program.    Why exercise?  Exercising regularly offers many healthy rewards. It can help you do all of the following:    Improve your blood cholesterol levels to help prevent further heart trouble    Lower your blood pressure to help prevent a stroke or heart attack    Control diabetes, or reduce your risk of getting this disease    Improve your heart and lung function    Reach and maintain a healthy weight    Make your muscles stronger and more limber so you can stay active    Prevent falls and fractures by slowing the loss of bone mass (osteoporosis)    Manage stress better  Exercise tips  Ease into your routine. Set small goals. Then build on them.  Exercise on most days. Aim for a total of 150 or more minutes of moderate to  vigorous intensity activity each week. Consider 40 minutes, 3 to 4 times a week. For best results, activity should last for 40 minutes on average. It is OK to work up to the 40 minute period over time. Examples of moderate-intensity activity is walking one mile in 15 minutes or 30 to 45 minutes of yard work.  Step up your daily activity level. Along with your exercise program, try being more  active throughout the day. Walk instead of drive. Do more household tasks or yard work.  Choose one or more activities you enjoy. Walking is one of the easiest things you can do. You can also try swimming, riding a bike, or taking an exercise class.  Stop exercising and call your doctor if you:    Have chest pain or feel dizzy or lightheaded    Feel burning, tightness, pressure, or heaviness in your chest, neck, shoulders, back, or arms    Have unusual shortness of breath    Have increased joint or muscle pain    Have palpitations or an irregular heartbeat      9675-5692 [employer]. [address] [place] [address]. All rights reserved. This information is not intended as a substitute for professional medical care. Always follow your healthcare professional's instructions.         Patient Education   Activities of Daily Living  Your Health Risk Assessment indicates you have difficulties with activities of daily living such as eating, getting dressed, grooming, bathing, walking, or using the toilet. Please make a follow up appointment for us to address this issue in more detail.     Patient Education   Instrumental Activities of Daily Living  Your Health Risk Assessment indicates you have difficulties with instrumental activities of daily living which include laundry, housekeeping, banking, shopping, using the telephone, food preparation, transportation, or taking your own medications. Please make a follow up appointment for us to address this issue in more detail.    [place] has resources available on the following website: [email or web address]/caregivers.[email or web address]     Also, here is a local agency that provides help with meals and other assistance:   [facility] Line: 295.124.1804     Patient Education   Urinary Incontinence (Male)    Urinary incontinence means not being able to control the release of urine from the bladder.  Causes  Common causes of urinary incontinence in men  include:    Infection    Certain medicines    Aging    Poor pelvic muscle tone    Bladder spasms    Obesity    Urinary retention  Nervous system diseases, diabetes, sleep apnea, urinary tract infections, prostate surgery, and pelvic trauma can also cause incontinence. Constipation and smoking have also been identified as risk factors.  Symptoms    Urge incontinence (also called overactive bladder) is a sudden urge to urinate even though there may not be much urine in the bladder. The need to urinate often during the night is common. It is due to bladder spasms.    Stress incontinence is involuntary urine leakage that can occur with sneezing, coughing, and other actions that put stress on the bladder.  Treatment  Treatment of urinary incontinence depends on the cause. Infections of the bladder are treated with antibiotics. Urinary retention is treated with a bladder catheter.  Home care  Follow these guidelines when caring for yourself at home:    Don't consume foods and drinks that may irritate the bladder. These include drinks containing alcohol, caffeinate, or carbonation; chocolate; and acidic fruits and juices.    Limit fluid intake to 6 to 8 cups a day.    Lose weight if you are overweight. This will reduce your symptoms.    If needed, wear absorbent pads to catch urine. Change pads frequently to maintain hygiene and prevent skin and bladder infections.    Bathe daily to maintain good hygiene.    If an antibiotic was prescribed to treat a bladder infection, be sure to take it until finished, even if you are feeling better before then. This is to make sure your infection has cleared.    If a catheter was left in place, it is important to keep bacteria from getting into the collection bag. Don't disconnect the catheter from the collection bag.    Use a leg band to secure the catheter drainage tube, so it does not pull on the catheter. Drain the collection bag when it becomes full using the drain spout at the  bottom of the bag. Don't disconnect the bag from the catheter.    Don't pull on or try to remove a catheter. The catheter must be removed by a healthcare provider.  Follow-up care  Follow up with your healthcare provider, or as advised.  When to seek medical advice  Call your healthcare provider right away if any of these occur:    Fever over 100.4 F (38 C), or as directed by your healthcare provider    Bladder pain or fullness    Abdominal swelling, nausea, or vomiting    Back pain    Weakness, dizziness, or fainting    If a catheter was left in place, return if:  ? Catheter falls out  ? Catheter stops draining for 6 hours  Date Last Reviewed: 10/1/2017    4824-7372 [employer]. [address] [place] [address], [place] [address]. All rights reserved. This information is not intended as a substitute for professional medical care. Always follow your healthcare professional's instructions.     Patient Education   Your Health Risk Assessment indicates you feel you are not in good emotional health.    Recreation   Recreation is not limited to sports and team events. It includes any activity that provides relaxation, interest, enjoyment, and exercise. Recreation provides an outlet for physical, mental, and social energy. It can give a sense of worth and achievement. It can help you stay healthy.    Mental Exercise and Social Involvement  Mental and emotional health is as important as physical health. Keep in touch with friends and family. Stay as active as possible. Continue to learn and challenge yourself.   Things you can do to stay mentally active are:    Learn something new, like a foreign language or musical instrument.     Play SCRABBLE or do crossword puzzles. If you cannot find people to play these games with you at home, you can play them with others on your computer through the Internet.     Join a games club--anything from card games to chess or checkers or lawn bowling.     Start a new hobby.     Go back  to school.     Volunteer.     Read.     Keep up with world events.       Patient Education   Depression and Suicide in Older Adults  Nearly 2 million older Americans have some type of depression. Sadly, some of them even take their own lives. Yet depression among older adults is often ignored. Learn the warning signs. You may help spare a loved one needless pain. You may also save a life.       What Is Depression?  Depression is a mood disorder that affects the way you think and feel. The most common symptom is a feeling of deep sadness. People who are depressed also may seem tired and listless. And nothing seems to give them pleasure. Its normal to grieve or be sad sometimes. But sadness lessens or passes with time. Depression rarely goes away or improves on its own. Other symptoms of depression are:    Sleeping more or less than normal    Eating more or less than normal    Having headaches, stomachaches, or other pains that dont go away    Feeling nervous, empty, or worthless    Crying a great deal    Thinking or talking about suicide or death    Feeling confused or forgetful  What Causes It?  The causes of depression arent fully known. Certain chemicals in the brain play a role. Depression does run in families. And life stresses can also trigger depression in some people. That may be the case with older adults. They often face great burdens, such as the death of friends or a spouse. They may have failing health. And they are more likely to be alone, lonely, or poor.  How You Can Help  Often, depressed people may not want to ask for help. When they do, they may be ignored. Or, they may receive the wrong treatment. You can help by showing parents and older friends love and support. If they seem depressed, help them find the right treatment. Talk to your doctor. Or contact a local mental health center, social service agency, or hospital. With modern treatment, no one has to suffer from  depression.  Resources:    National [place] of Mental Health  657.864.8170  www.nimh.nih.gov    National [place]a on Mental Illness  667.830.7483  www.presley.org    Mental Health [facility]  950.781.1550  www.[facility].org    National Suicide Hotline  879.350.2610 (800-SUICIDE)      1088-8756 [employer]. [address] [place] [address]. All rights reserved. This information is not intended as a substitute for professional medical care. Always follow your healthcare professional's instructions.         Patient Education   Preventing Falls in the Home  As you get older, falls are more likely. Thats because your reaction time slows. Your muscles and joints may also get stiffer, making them less flexible. Illness, medications, and vision changes can also affect your balance. A fall could leave you unable to live on your own. To make your home safer, follow these tips:    Floors    Put nonskid pads under area rugs.    Remove throw rugs.    Replace worn floor coverings.    Tack carpets firmly to each step on carpeted stairs. Put nonskid strips on the edges of uncarpeted stairs.    Keep floors and stairs free of clutter and cords.    Arrange furniture so there are clear pathways.    Clean up any spills right away.    Bathrooms    Install grab bars in the tub or shower.    Apply nonskid strips or put a nonskid rubber mat in the tub or shower.    Sit on a bath chair to bathe.    Use bathmats with nonskid backing.    Lighting    Keep a flashlight in each room.    Put a nightlight along the pathway between the bedroom and the bathroom.    1041-0523 [employer]. [address] [place] [address]. All rights reserved. This information is not intended as a substitute for professional medical care. Always follow your healthcare professional's instructions.           Advance Directive  Patients advance directive was discussed and I am comfortable with the patients wishes.  Patient Education    Personalized Prevention Plan  You are due for the preventive services outlined below.  Your care team is available to assist you in scheduling these services.  If you have already completed any of these items, please share that information with your care team to update in your medical record.  Health Maintenance   Topic Date Due   ? ZOSTER VACCINES (2 of 3) 12/31/2010   ? MEDICARE ANNUAL WELLNESS VISIT  06/07/2020   ? INFLUENZA VACCINE RULE BASED (1) 08/01/2020   ? FALL RISK ASSESSMENT  07/31/2021   ? ADVANCE CARE PLANNING  06/07/2024   ? LIPID  07/31/2025   ? COLORECTAL CANCER SCREENING  06/01/2027   ? TD 18+ HE  07/31/2030   ? DEPRESSION ACTION PLAN  Completed   ? PNEUMOCOCCAL IMMUNIZATION 65+ LOW/MEDIUM RISK  Completed   ? HEPATITIS B VACCINES  Aged Out   ? HEPATITIS C SCREENING  Discontinued

## 2021-10-17 ENCOUNTER — HEALTH MAINTENANCE LETTER (OUTPATIENT)
Age: 71
End: 2021-10-17

## 2021-10-19 PROBLEM — F32.9 MAJOR DEPRESSION: Chronic | Status: ACTIVE | Noted: 2020-08-02

## 2022-04-24 DIAGNOSIS — N40.1 BENIGN PROSTATIC HYPERPLASIA WITH URINARY OBSTRUCTION: ICD-10-CM

## 2022-04-24 DIAGNOSIS — N13.8 BENIGN PROSTATIC HYPERPLASIA WITH URINARY OBSTRUCTION: ICD-10-CM

## 2022-04-24 DIAGNOSIS — E78.2 MIXED HYPERLIPIDEMIA: ICD-10-CM

## 2022-04-24 DIAGNOSIS — I10 ESSENTIAL HYPERTENSION: Primary | ICD-10-CM

## 2022-04-24 RX ORDER — METOPROLOL SUCCINATE 25 MG/1
TABLET, EXTENDED RELEASE ORAL
Qty: 90 TABLET | Refills: 1 | Status: SHIPPED | OUTPATIENT
Start: 2022-04-24 | End: 2023-01-18

## 2022-04-24 RX ORDER — TAMSULOSIN HYDROCHLORIDE 0.4 MG/1
CAPSULE ORAL
Qty: 90 CAPSULE | Refills: 3 | Status: SHIPPED | OUTPATIENT
Start: 2022-04-24 | End: 2023-05-02

## 2022-04-24 RX ORDER — ATORVASTATIN CALCIUM 80 MG/1
TABLET, FILM COATED ORAL
Qty: 90 TABLET | Refills: 3 | Status: SHIPPED | OUTPATIENT
Start: 2022-04-24 | End: 2023-05-02

## 2022-05-05 ENCOUNTER — OFFICE VISIT (OUTPATIENT)
Dept: INTERNAL MEDICINE | Facility: CLINIC | Age: 72
End: 2022-05-05
Payer: MEDICARE

## 2022-05-05 VITALS
RESPIRATION RATE: 18 BRPM | WEIGHT: 165.4 LBS | HEIGHT: 69 IN | DIASTOLIC BLOOD PRESSURE: 74 MMHG | BODY MASS INDEX: 24.5 KG/M2 | OXYGEN SATURATION: 98 % | HEART RATE: 88 BPM | SYSTOLIC BLOOD PRESSURE: 116 MMHG

## 2022-05-05 DIAGNOSIS — M17.0 PRIMARY OSTEOARTHRITIS OF BOTH KNEES: ICD-10-CM

## 2022-05-05 DIAGNOSIS — I69.359 HEMIPLEGIA AS LATE EFFECT OF CEREBROVASCULAR ACCIDENT (CVA) (H): ICD-10-CM

## 2022-05-05 DIAGNOSIS — Z95.3 S/P AORTIC VALVE REPLACEMENT WITH BIOPROSTHETIC VALVE: ICD-10-CM

## 2022-05-05 DIAGNOSIS — K59.09 CHRONIC CONSTIPATION: ICD-10-CM

## 2022-05-05 DIAGNOSIS — F32.A MILD DEPRESSION: Chronic | ICD-10-CM

## 2022-05-05 DIAGNOSIS — Z00.00 MEDICARE ANNUAL WELLNESS VISIT, SUBSEQUENT: Primary | ICD-10-CM

## 2022-05-05 DIAGNOSIS — I10 ESSENTIAL HYPERTENSION: ICD-10-CM

## 2022-05-05 DIAGNOSIS — I63.411 CEREBROVASCULAR ACCIDENT (CVA) DUE TO EMBOLISM OF RIGHT MIDDLE CEREBRAL ARTERY (H): Chronic | ICD-10-CM

## 2022-05-05 DIAGNOSIS — E78.2 MIXED HYPERLIPIDEMIA: ICD-10-CM

## 2022-05-05 PROBLEM — I69.391 DYSPHAGIA AS LATE EFFECT OF CEREBROVASCULAR ACCIDENT (CVA): Status: ACTIVE | Noted: 2019-10-04

## 2022-05-05 PROBLEM — I63.9 CVA (CEREBRAL VASCULAR ACCIDENT) (H): Chronic | Status: ACTIVE | Noted: 2019-09-01

## 2022-05-05 PROBLEM — Z78.9 FULL CODE STATUS: Chronic | Status: ACTIVE | Noted: 2017-06-07

## 2022-05-05 PROBLEM — H20.11 CHRONIC IRITIS OF RIGHT EYE: Status: ACTIVE | Noted: 2019-10-28

## 2022-05-05 LAB
ALBUMIN SERPL-MCNC: 4.3 G/DL (ref 3.5–5)
ALP SERPL-CCNC: 110 U/L (ref 45–120)
ALT SERPL W P-5'-P-CCNC: 31 U/L (ref 0–45)
ANION GAP SERPL CALCULATED.3IONS-SCNC: 14 MMOL/L (ref 5–18)
AST SERPL W P-5'-P-CCNC: 29 U/L (ref 0–40)
BILIRUB SERPL-MCNC: 0.9 MG/DL (ref 0–1)
BUN SERPL-MCNC: 16 MG/DL (ref 8–28)
CALCIUM SERPL-MCNC: 9.8 MG/DL (ref 8.5–10.5)
CHLORIDE BLD-SCNC: 106 MMOL/L (ref 98–107)
CHOLEST SERPL-MCNC: 126 MG/DL
CO2 SERPL-SCNC: 23 MMOL/L (ref 22–31)
CREAT SERPL-MCNC: 1.03 MG/DL (ref 0.7–1.3)
ERYTHROCYTE [DISTWIDTH] IN BLOOD BY AUTOMATED COUNT: 13.4 % (ref 10–15)
FASTING STATUS PATIENT QL REPORTED: YES
GFR SERPL CREATININE-BSD FRML MDRD: 77 ML/MIN/1.73M2
GLUCOSE BLD-MCNC: 105 MG/DL (ref 70–125)
HCT VFR BLD AUTO: 48.4 % (ref 40–53)
HDLC SERPL-MCNC: 52 MG/DL
HGB BLD-MCNC: 16.4 G/DL (ref 13.3–17.7)
LDLC SERPL CALC-MCNC: 58 MG/DL
MCH RBC QN AUTO: 31.6 PG (ref 26.5–33)
MCHC RBC AUTO-ENTMCNC: 33.9 G/DL (ref 31.5–36.5)
MCV RBC AUTO: 93 FL (ref 78–100)
PLATELET # BLD AUTO: 187 10E3/UL (ref 150–450)
POTASSIUM BLD-SCNC: 4.2 MMOL/L (ref 3.5–5)
PROT SERPL-MCNC: 7 G/DL (ref 6–8)
RBC # BLD AUTO: 5.19 10E6/UL (ref 4.4–5.9)
SODIUM SERPL-SCNC: 143 MMOL/L (ref 136–145)
TRIGL SERPL-MCNC: 82 MG/DL
WBC # BLD AUTO: 8.8 10E3/UL (ref 4–11)

## 2022-05-05 PROCEDURE — 80061 LIPID PANEL: CPT | Performed by: INTERNAL MEDICINE

## 2022-05-05 PROCEDURE — 36415 COLL VENOUS BLD VENIPUNCTURE: CPT | Performed by: INTERNAL MEDICINE

## 2022-05-05 PROCEDURE — G0439 PPPS, SUBSEQ VISIT: HCPCS | Performed by: INTERNAL MEDICINE

## 2022-05-05 PROCEDURE — 99214 OFFICE O/P EST MOD 30 MIN: CPT | Mod: 25 | Performed by: INTERNAL MEDICINE

## 2022-05-05 PROCEDURE — 85027 COMPLETE CBC AUTOMATED: CPT | Performed by: INTERNAL MEDICINE

## 2022-05-05 PROCEDURE — 80053 COMPREHEN METABOLIC PANEL: CPT | Performed by: INTERNAL MEDICINE

## 2022-05-05 ASSESSMENT — ACTIVITIES OF DAILY LIVING (ADL)
CURRENT_FUNCTION: PREPARING MEALS REQUIRES ASSISTANCE
CURRENT_FUNCTION: HOUSEWORK REQUIRES ASSISTANCE
CURRENT_FUNCTION: SHOPPING REQUIRES ASSISTANCE
CURRENT_FUNCTION: MEDICATION ADMINISTRATION REQUIRES ASSISTANCE
CURRENT_FUNCTION: BATHING REQUIRES ASSISTANCE
CURRENT_FUNCTION: LAUNDRY REQUIRES ASSISTANCE
CURRENT_FUNCTION: TRANSPORTATION REQUIRES ASSISTANCE

## 2022-05-05 ASSESSMENT — PATIENT HEALTH QUESTIONNAIRE - PHQ9
10. IF YOU CHECKED OFF ANY PROBLEMS, HOW DIFFICULT HAVE THESE PROBLEMS MADE IT FOR YOU TO DO YOUR WORK, TAKE CARE OF THINGS AT HOME, OR GET ALONG WITH OTHER PEOPLE: SOMEWHAT DIFFICULT
SUM OF ALL RESPONSES TO PHQ QUESTIONS 1-9: 7
SUM OF ALL RESPONSES TO PHQ QUESTIONS 1-9: 7

## 2022-05-05 NOTE — PROGRESS NOTES
[facility] - [facility] Specialists    Comprehensive and complex medical care - Chronic disease management - Shared decision making - Care coordination - Compassionate care    Patient advocacy - Rational deprescribing - Minimally disruptive medicine - Ethical focus - Customized care         Date of Service: 5/5/2022  Primary Provider: Juan Francisco Chen    Patient Care Team:  Juan Francisco Chen MD as PCP - General  Juan Francisco Chen MD as Assigned PCP  Rocco Solis MD as MD          Patient's Pharmacy:    [facility] - [facility] [address] [facility]  [address] [facility] [address]  Phone: [phone] Fax: [phone]    [facility] - [facility] [address] [facility]  [address] [facility] [address]  Phone: [phone] Fax: [phone]     Patient's Contacts:  Name Home Phone Work Phone Mobile Phone Relationship Lgl VERONA [place] [phone] Spouse      Patient's Insurance:    Payor: MEDICARE / Plan: MEDICARE / Product Type: Medicare /      Subjective:     History of present illness:    Aguilar Garcia is an 72 year old male here for an annual wellness visit.    The issues he would like to address at today's visit include the following:    Chief Complaint   Patient presents with     Annual Visit      Patient comes in today to review his health and do an annual wellness visit.    He is generally about the same as last year.  His weight is stable.    He does have issues with constipation.  He has a bowel movement every 2 to 3 days.  He does use prunes daily and might have to take 2 senna and MiraLAX to help with his bowels.  He has sticky stools.  It is hard to eliminate at times.  His current medications do not help a lot.  He had a lot of problems when he was in [facility] [place] for rehab.    He does get a lot of general itching without skin rash.  He does not have usual skin lesions.    Reviewed his  osteoarthritis of his knees and his knees have not been bothering him too much.  He does not walk is much as he would like to at this point.    His blood pressure is looking excellent and we will follow-up on his cholesterol today.    He is a bit better in relationship to his depression.  He has been on a number of medications without help.  He is not looking to have anything more with this.    He does want to hold off on the COVID booster at this time.  He will want to get shingles vaccine through the pharmacy.    We reviewed his other issues noted in the assessment but not specifically addressed in the HPI above.           Active Problem List:  Problem List as of 5/5/2022 Reviewed: 5/5/2022 11:13 AM by Juan Francisco Chen MD       High    Nonsmoker    Full code status    CVA (cerebral vascular accident) (H)       Medium    Moderate major depression (H)    Osteoarthritis of both knees    HLD (hyperlipidemia)    ED (erectile dysfunction)    Chronic iritis of right eye    Dysphagia as late effect of cerebrovascular accident (CVA)    Hemiplegia as late effect of cerebrovascular accident (CVA) (H)    S/P aortic valve replacement with bioprosthetic valve    Constipation, unspecified constipation type       Low    BPH (benign prostatic hyperplasia)           Past Medical History:   Diagnosis Date     Aortic stenosis      BPH (benign prostatic hyperplasia) 8/2/2020     ED (erectile dysfunction)      H/O iritis      HLD (hyperlipidemia)      Nonsmoker      Osteoarthritis of both knees      Past Surgical History:   Procedure Laterality Date     ARTHROSCOPY KNEE       HAND SURGERY       THROMBECTOMY W/ EMBOLECTOMY  09/06/2019     TISSUE AORTIC VALVE REPLACEMENT  2020     Family History   Problem Relation Age of Onset     Heart Failure Mother      Rheumatoid Arthritis Mother      Other - See Comments Mother         Temporal arteritis     Coronary Artery Disease Father      No Known Problems Daughter      No Known Problems  Daughter      Melanoma Son      Family history is otherwise noncontributory.     Social History     Occupational History     Not on file   Tobacco Use     Smoking status: Never Smoker     Smokeless tobacco: Never Used   Substance and Sexual Activity     Alcohol use: Yes     Drug use: Not on file     Sexual activity: Not on file      Social History     Social History Narrative    Patient of Dr. Chen since 2015.  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    [place] in [place].      Current Outpatient Medications   Medication Instructions     amoxicillin (AMOXIL) 2,000 mg, Oral, ONCE PRN     aspirin (ASA) 81 mg, DAILY     atorvastatin (LIPITOR) 80 MG tablet TAKE 1 TABLET (80 MG TOTAL) BY MOUTH DAILY.     cholecalciferol, vitamin D3, 2,000 unit Tab [CHOLECALCIFEROL, VITAMIN D3, 2,000 UNIT TAB] Take by mouth.     metoprolol succinate ER (TOPROL-XL) 25 MG 24 hr tablet TAKE 1 TABLET EVERY DAY     tamsulosin (FLOMAX) 0.4 MG capsule TAKE 1 CAPSULE (0.4 MG TOTAL) BY MOUTH DAILY AFTER SUPPER.     vitamin B-12 (CYANOCOBALAMIN) 1,000 mcg     Allergies: Sulfa (sulfonamide antibiotics) [sulfa drugs]     Immunization History   Administered Date(s) Administered     COVID-19,PF,Pfizer (12+ Yrs) 01/29/2021, 02/18/2021, 09/27/2021     FLUAD(HD)65+ QUAD 09/10/2020, 09/20/2021     Flu 65+ Years 10/09/2017     Flu, Unspecified 10/08/2015, 10/01/2018, 09/20/2020     Influenza (High Dose) 3 valent vaccine 10/07/2015, 10/20/2016     Influenza Vaccine IM > 6 months Valent IIV4 (Alfuria,Fluzone) 09/29/2019     Pneumo Conj 13-V (2010&after) 10/07/2015     Pneumococcal 23 valent 10/01/2014     TD (ADULT, 7+) 07/31/2020     Td (Adult), Adsorbed 11/05/2010     Td,adult,historic,unspecified 11/05/2010     Zoster vaccine, live 11/05/2010      Objective:     Wt Readings from Last 3 Encounters:   05/05/22 75 kg (165 lb 6.4 oz)   05/24/21 75.3 kg (166 lb)   07/31/20 81.2 kg (179 lb)     BP Readings from Last 3 Encounters:   05/05/22 116/74   05/24/21 116/70  "  07/31/20 118/66       PHYSICAL EXAM  /74   Pulse 88   Resp 18   Ht 1.753 m (5' 9\")   Wt 75 kg (165 lb 6.4 oz)   SpO2 98%   BMI 24.43 kg/m     The patient is comfortable, no acute distress.  Mood good.  Insight is good.  No skin lesions or nodules of concern.  Ears clear.  Eyes are nonicteric.  Pupils equal and reactive.  Throat is clear.  Neck is supple without mass, no thyromegaly. No cervical or epitrochlear adenopathy.  Heart regular rate and rhythm.  No murmur.  Lungs clear to auscultation bilaterally.  Respiratory effort good.  Abdomen soft and nontender.  No hepatosplenomegaly.  Extremities show no edema.  Left hemiplegia more pronounced in the left upper extremity.       Diagnostics:     Office Visit - [facility] on 05/24/2021   Component Date Value Ref Range Status     WBC 05/24/2021 9.3  4.0 - 11.0 thou/uL Final     RBC Count 05/24/2021 5.22  4.40 - 6.20 mill/uL Final     Hemoglobin 05/24/2021 16.1  14.0 - 18.0 g/dL Final     Hematocrit 05/24/2021 47.6  40.0 - 54.0 % Final     MCV 05/24/2021 91  80 - 100 fL Final     MCH 05/24/2021 30.8  27.0 - 34.0 pg Final     MCHC 05/24/2021 33.8  32.0 - 36.0 g/dL Final     RDW 05/24/2021 13.3  11.0 - 14.5 % Final     Platelet Count 05/24/2021 207  140 - 440 thou/uL Final     Mean Platelet Volume 05/24/2021 10.5 (A) 7.0 - 10.0 fL Final     Sodium 05/24/2021 143  136 - 145 mmol/L Final     Potassium 05/24/2021 4.3  3.5 - 5.0 mmol/L Final     Chloride 05/24/2021 108 (A) 98 - 107 mmol/L Final     Carbon Dioxide (CO2) 05/24/2021 23  22 - 31 mmol/L Final     Anion Gap 05/24/2021 12  5 - 18 mmol/L Final     Glucose 05/24/2021 112  70 - 125 mg/dL Final     Urea Nitrogen 05/24/2021 14  8 - 28 mg/dL Final     Creatinine 05/24/2021 1.05  0.70 - 1.30 mg/dL Final     GFR Estimate If Black 05/24/2021 >60  >60 mL/min/1.73m2 Final     GFR Estimate 05/24/2021 >60  >60 mL/min/1.73m2 Final     Bilirubin Total 05/24/2021 0.8  0.0 - 1.0 mg/dL Final     Calcium 05/24/2021 9.4  " 8.5 - 10.5 mg/dL Final     Protein Total 05/24/2021 6.8  6.0 - 8.0 g/dL Final     Albumin 05/24/2021 4.0  3.5 - 5.0 g/dL Final     Alkaline Phosphatase 05/24/2021 101  45 - 120 U/L Final     AST 05/24/2021 17  0 - 40 U/L Final     ALT 05/24/2021 16  0 - 45 U/L Final     TSH 05/24/2021 1.66  0.30 - 5.00 uIU/mL Final     Vitamin B12 05/24/2021 558  213 - 816 pg/mL Final     Cholesterol 05/24/2021 125  <=199 mg/dL Final     Triglycerides 05/24/2021 99  <=149 mg/dL Final     Direct Measure HDL 05/24/2021 42  >=40 mg/dL Final     LDL Cholesterol Calculated 05/24/2021 63  <=129 mg/dL Final     Patient Fasting > 8hrs? 05/24/2021 Unknown   Final     Prostate Specific Antigen Screen 05/24/2021 3.0  0.0 - 6.5 ng/mL Final        Results for orders placed or performed in visit on 05/05/22   CBC with platelets     Status: Normal   Result Value Ref Range    WBC Count 8.8 4.0 - 11.0 10e3/uL    RBC Count 5.19 4.40 - 5.90 10e6/uL    Hemoglobin 16.4 13.3 - 17.7 g/dL    Hematocrit 48.4 40.0 - 53.0 %    MCV 93 78 - 100 fL    MCH 31.6 26.5 - 33.0 pg    MCHC 33.9 31.5 - 36.5 g/dL    RDW 13.4 10.0 - 15.0 %    Platelet Count 187 150 - 450 10e3/uL        Assessment:     1. Medicare annual wellness visit, subsequent    2. Hemiplegia as late effect of cerebrovascular accident (CVA) (H)-stable at this time.  Wife looking into some therapy through [place].   3. Mixed hyperlipidemia    4. Essential hypertension    5. Mild depression (H)- doing better with managing.   6. Chronic constipation    7. Primary osteoarthritis of both knees    8. S/P aortic valve replacement with bioprosthetic valve    9. Cerebrovascular accident (CVA) due to embolism of right middle cerebral artery (H)-no further recurrence.        Plan:     1. Could consider follow-up echocardiogram in the future of his heart, but not needed at this time.  2. See patient instructions below.  3. Blood work done today.  4. Continue current medications.  5. Follow up sooner if  issues.    Patient Instructions   1. Take the Miralax daily and see if this works for your stools.  2. If this does not work, I recommend taking metamucil once daily instead.  3. Could go to [facility] in the future for therapy if desired.  4. Blood work done today.  5. Shingrix (shingles vaccine) at your pharmacy.  6. Follow up in 1 year, sooner if issues.      A personalized health plan based on the identified health risks was provided to the patient on the AVS.       Juan Francisco Chen MD  General Internal Medicine  [facility]    Return in about 1 year (around 5/5/2023) for visit and blood work, annual wellness visit.     No future appointments.        ______________________________________________________________________     PHQ-9 score:    PHQ 5/5/2022   PHQ-9 Total Score 7   Q9: Thoughts of better off dead/self-harm past 2 weeks Several days   F/U: Thoughts of suicide or self-harm No   F/U: Safety concerns No     No flowsheet data found.  _____________________________________________________________________     Depression Screening Follow Up    Follow Up Actions Taken  Patient to follow up with PCP.  Clinic staff to schedule appointment if able.

## 2022-05-05 NOTE — PATIENT INSTRUCTIONS
Take the Miralax daily and see if this works for your stools.  If this does not work, I recommend taking metamucil once daily instead.  [facility]  Blood work done today.  Shingrix (shingles vaccine) at your pharmacy.  Follow up in 1 year, sooner if issues.

## 2022-05-05 NOTE — PROGRESS NOTES
"HPI  Do you feel safe in your environment? Yes  Fall risk  Fallen 2 or more times in the past year?: (P) No  Any fall with injury in the past year?: (P) No    Cognitive Screening   1) Repeat 3 items (Leader, Season, Table)    2) Clock draw: NORMAL  3) 3 item recall: Recalls 3 objects  Results: 3 items recalled: COGNITIVE IMPAIRMENT LESS LIKELY    Mini-CogTM Copyright MATILDA Nieto. Licensed by the author for use in [facility]; reprinted with permission ([email or web address]). All rights reserved.    Answers for HPI/ROS submitted by the patient on 5/5/2022  If you checked off any problems, how difficult have these problems made it for you to do your work, take care of things at home, or get along with other people?: Somewhat difficult  PHQ9 TOTAL SCORE: 7  In general, how would you rate your overall physical health?: fair  Frequency of exercise:: None  Do you usually eat at least 4 servings of fruit and vegetables a day, include whole grains & fiber, and avoid regularly eating high fat or \"junk\" foods? : Yes  Taking medications regularly:: Yes  Medication side effects:: None  Activities of Daily Living: transportation requires assistance, shopping requires assistance, preparing meals requires assistance, housework requires assistance, bathing requires assistance, laundry requires assistance, medication administration requires assistance  Home safety: throw rugs in the hallway  Hearing Impairment:: no hearing concerns  In the past 6 months, have you been bothered by leaking of urine?: Yes  In general, how would you rate your overall mental or emotional health?: fair  Additional concerns today:: No      "

## 2022-05-06 ASSESSMENT — PATIENT HEALTH QUESTIONNAIRE - PHQ9: SUM OF ALL RESPONSES TO PHQ QUESTIONS 1-9: 7

## 2022-10-01 ENCOUNTER — HEALTH MAINTENANCE LETTER (OUTPATIENT)
Age: 72
End: 2022-10-01

## 2022-11-07 ENCOUNTER — IMMUNIZATION (OUTPATIENT)
Dept: FAMILY MEDICINE | Facility: CLINIC | Age: 72
End: 2022-11-07
Payer: MEDICARE

## 2022-11-07 PROCEDURE — 90662 IIV NO PRSV INCREASED AG IM: CPT

## 2022-11-07 PROCEDURE — 91312 COVID-19,PF,PFIZER BOOSTER BIVALENT: CPT

## 2022-11-07 PROCEDURE — 0124A COVID-19,PF,PFIZER BOOSTER BIVALENT: CPT

## 2022-11-07 PROCEDURE — G0008 ADMIN INFLUENZA VIRUS VAC: HCPCS | Mod: 59

## 2023-01-16 DIAGNOSIS — I10 ESSENTIAL HYPERTENSION: ICD-10-CM

## 2023-01-18 RX ORDER — METOPROLOL SUCCINATE 25 MG/1
TABLET, EXTENDED RELEASE ORAL
Qty: 90 TABLET | Refills: 1 | Status: SHIPPED | OUTPATIENT
Start: 2023-01-18 | End: 2023-11-13

## 2023-01-18 NOTE — TELEPHONE ENCOUNTER
"  Last Written Prescription Date:  4/24/2022  Last Fill Quantity: 90,  # refills: 1   Last office visit provider:  5/5/2022     Requested Prescriptions   Pending Prescriptions Disp Refills     metoprolol succinate ER (TOPROL XL) 25 MG 24 hr tablet [Pharmacy Med Name: METOPROLOL SUCCINATE ER 25 MG Tablet Extended Release 24 Hour] 90 tablet 1     Sig: TAKE 1 TABLET EVERY DAY       Beta-Blockers Protocol Passed - 1/18/2023 11:47 AM        Passed - Blood pressure under 140/90 in past 12 months     BP Readings from Last 3 Encounters:   05/05/22 116/74   05/24/21 116/70   07/31/20 118/66                 Passed - Patient is age 6 or older        Passed - Recent (12 mo) or future (30 days) visit within the authorizing provider's specialty     Patient has had an office visit with the authorizing provider or a provider within the authorizing providers department within the previous 12 mos or has a future within next 30 days. See \"Patient Info\" tab in inbasket, or \"Choose Columns\" in Meds & Orders section of the refill encounter.              Passed - Medication is active on med list             Filomena Fried RN 01/18/23 11:47 AM  "

## 2023-03-08 ENCOUNTER — DOCUMENTATION ONLY (OUTPATIENT)
Dept: OTHER | Facility: CLINIC | Age: 73
End: 2023-03-08
Payer: MEDICARE

## 2023-05-01 DIAGNOSIS — N40.1 BENIGN PROSTATIC HYPERPLASIA WITH URINARY OBSTRUCTION: ICD-10-CM

## 2023-05-01 DIAGNOSIS — N13.8 BENIGN PROSTATIC HYPERPLASIA WITH URINARY OBSTRUCTION: ICD-10-CM

## 2023-05-01 DIAGNOSIS — E78.2 MIXED HYPERLIPIDEMIA: ICD-10-CM

## 2023-05-02 RX ORDER — ATORVASTATIN CALCIUM 80 MG/1
TABLET, FILM COATED ORAL
Qty: 90 TABLET | Refills: 0 | Status: SHIPPED | OUTPATIENT
Start: 2023-05-02 | End: 2023-11-13

## 2023-05-02 RX ORDER — TAMSULOSIN HYDROCHLORIDE 0.4 MG/1
CAPSULE ORAL
Qty: 90 CAPSULE | Refills: 0 | Status: SHIPPED | OUTPATIENT
Start: 2023-05-02 | End: 2023-11-13

## 2023-05-02 NOTE — TELEPHONE ENCOUNTER
"Last Written Prescription Date:  4/24/22  Last Fill Quantity: 90,  # refills: 3   Last office visit provider:  5/5/22, PCP     Requested Prescriptions   Pending Prescriptions Disp Refills     tamsulosin (FLOMAX) 0.4 MG capsule [Pharmacy Med Name: TAMSULOSIN HYDROCHLORIDE 0.4 MG Capsule] 90 capsule 3     Sig: TAKE 1 CAPSULE DAILY AFTER SUPPER.       Alpha Blockers Passed - 5/1/2023 11:17 PM        Passed - Blood pressure under 140/90 in past 12 months     BP Readings from Last 3 Encounters:   05/05/22 116/74   05/24/21 116/70   07/31/20 118/66                 Passed - Recent (12 mo) or future (30 days) visit within the authorizing provider's specialty     Patient has had an office visit with the authorizing provider or a provider within the authorizing providers department within the previous 12 mos or has a future within next 30 days. See \"Patient Info\" tab in inbasket, or \"Choose Columns\" in Meds & Orders section of the refill encounter.              Passed - Patient does not have Tadalafil, Vardenafil, or Sildenafil on their medication list        Passed - Medication is active on med list        Passed - Patient is 18 years of age or older        Last Written Prescription Date:  4/24/22  Last Fill Quantity: 90,  # refills: 3   Last office visit provider:  5/5/22, PCP        atorvastatin (LIPITOR) 80 MG tablet [Pharmacy Med Name: ATORVASTATIN CALCIUM 80 MG Tablet] 90 tablet 3     Sig: TAKE 1 TABLET EVERY DAY       Statins Protocol Passed - 5/1/2023 11:17 PM        Passed - LDL on file in past 12 months     Recent Labs   Lab Test 05/05/22  1143   LDL 58             Passed - No abnormal creatine kinase in past 12 months     No lab results found.             Passed - Recent (12 mo) or future (30 days) visit within the authorizing provider's specialty     Patient has had an office visit with the authorizing provider or a provider within the authorizing providers department within the previous 12 mos or has a future " "within next 30 days. See \"Patient Info\" tab in inbasket, or \"Choose Columns\" in Meds & Orders section of the refill encounter.              Passed - Medication is active on med list        Passed - Patient is age 18 or older             Paola Spangler RN 05/02/23 6:14 PM  "

## 2023-05-12 ASSESSMENT — ENCOUNTER SYMPTOMS
WEAKNESS: 1
DIZZINESS: 1
SORE THROAT: 0
HEARTBURN: 0
JOINT SWELLING: 0
FEVER: 0
MYALGIAS: 1
EYE PAIN: 1
COUGH: 1
FREQUENCY: 1
DIARRHEA: 0
ABDOMINAL PAIN: 0
PALPITATIONS: 0
NERVOUS/ANXIOUS: 0
CHILLS: 0
DYSURIA: 0
ARTHRALGIAS: 1
HEADACHES: 0
HEMATURIA: 0
SHORTNESS OF BREATH: 0
NAUSEA: 0
CONSTIPATION: 0
PARESTHESIAS: 1
HEMATOCHEZIA: 0

## 2023-05-12 ASSESSMENT — ACTIVITIES OF DAILY LIVING (ADL)
CURRENT_FUNCTION: SHOPPING REQUIRES ASSISTANCE
CURRENT_FUNCTION: LAUNDRY REQUIRES ASSISTANCE
CURRENT_FUNCTION: PREPARING MEALS REQUIRES ASSISTANCE
CURRENT_FUNCTION: MEDICATION ADMINISTRATION REQUIRES ASSISTANCE
CURRENT_FUNCTION: BATHING REQUIRES ASSISTANCE
CURRENT_FUNCTION: HOUSEWORK REQUIRES ASSISTANCE
CURRENT_FUNCTION: MONEY MANAGEMENT REQUIRES ASSISTANCE
CURRENT_FUNCTION: TRANSPORTATION REQUIRES ASSISTANCE

## 2023-05-18 ENCOUNTER — OFFICE VISIT (OUTPATIENT)
Dept: INTERNAL MEDICINE | Facility: CLINIC | Age: 73
End: 2023-05-18
Payer: MEDICARE

## 2023-05-18 VITALS
OXYGEN SATURATION: 96 % | BODY MASS INDEX: 24.14 KG/M2 | HEART RATE: 79 BPM | RESPIRATION RATE: 16 BRPM | WEIGHT: 163 LBS | DIASTOLIC BLOOD PRESSURE: 79 MMHG | TEMPERATURE: 97.9 F | SYSTOLIC BLOOD PRESSURE: 110 MMHG | HEIGHT: 69 IN

## 2023-05-18 DIAGNOSIS — Z95.3 S/P AORTIC VALVE REPLACEMENT WITH BIOPROSTHETIC VALVE: ICD-10-CM

## 2023-05-18 DIAGNOSIS — I69.359 HEMIPLEGIA AS LATE EFFECT OF CEREBROVASCULAR ACCIDENT (CVA) (H): ICD-10-CM

## 2023-05-18 DIAGNOSIS — I10 ESSENTIAL HYPERTENSION: ICD-10-CM

## 2023-05-18 DIAGNOSIS — N40.1 BENIGN PROSTATIC HYPERPLASIA WITH URINARY OBSTRUCTION: ICD-10-CM

## 2023-05-18 DIAGNOSIS — Z12.5 SCREENING FOR PROSTATE CANCER: ICD-10-CM

## 2023-05-18 DIAGNOSIS — N13.8 BENIGN PROSTATIC HYPERPLASIA WITH URINARY OBSTRUCTION: ICD-10-CM

## 2023-05-18 DIAGNOSIS — E78.2 MIXED HYPERLIPIDEMIA: ICD-10-CM

## 2023-05-18 DIAGNOSIS — K59.09 CHRONIC CONSTIPATION: ICD-10-CM

## 2023-05-18 DIAGNOSIS — Z00.00 ENCOUNTER FOR MEDICARE ANNUAL WELLNESS EXAM: Primary | ICD-10-CM

## 2023-05-18 DIAGNOSIS — M17.0 PRIMARY OSTEOARTHRITIS OF BOTH KNEES: ICD-10-CM

## 2023-05-18 DIAGNOSIS — I63.411 CEREBROVASCULAR ACCIDENT (CVA) DUE TO EMBOLISM OF RIGHT MIDDLE CEREBRAL ARTERY (H): Chronic | ICD-10-CM

## 2023-05-18 PROBLEM — F32.A MILD DEPRESSION: Chronic | Status: RESOLVED | Noted: 2020-08-02 | Resolved: 2023-05-18

## 2023-05-18 LAB
ALBUMIN SERPL BCG-MCNC: 4.4 G/DL (ref 3.5–5.2)
ALP SERPL-CCNC: 104 U/L (ref 40–129)
ALT SERPL W P-5'-P-CCNC: 54 U/L (ref 10–50)
ANION GAP SERPL CALCULATED.3IONS-SCNC: 10 MMOL/L (ref 7–15)
AST SERPL W P-5'-P-CCNC: 46 U/L (ref 10–50)
BILIRUB SERPL-MCNC: 0.7 MG/DL
BUN SERPL-MCNC: 15.6 MG/DL (ref 8–23)
CALCIUM SERPL-MCNC: 9.6 MG/DL (ref 8.8–10.2)
CHLORIDE SERPL-SCNC: 108 MMOL/L (ref 98–107)
CREAT SERPL-MCNC: 1.04 MG/DL (ref 0.67–1.17)
DEPRECATED HCO3 PLAS-SCNC: 25 MMOL/L (ref 22–29)
ERYTHROCYTE [DISTWIDTH] IN BLOOD BY AUTOMATED COUNT: 13.7 % (ref 10–15)
GFR SERPL CREATININE-BSD FRML MDRD: 76 ML/MIN/1.73M2
GLUCOSE SERPL-MCNC: 107 MG/DL (ref 70–99)
HCT VFR BLD AUTO: 48.9 % (ref 40–53)
HGB BLD-MCNC: 16.5 G/DL (ref 13.3–17.7)
MCH RBC QN AUTO: 32.2 PG (ref 26.5–33)
MCHC RBC AUTO-ENTMCNC: 33.7 G/DL (ref 31.5–36.5)
MCV RBC AUTO: 96 FL (ref 78–100)
PLATELET # BLD AUTO: 176 10E3/UL (ref 150–450)
POTASSIUM SERPL-SCNC: 4.3 MMOL/L (ref 3.4–5.3)
PROT SERPL-MCNC: 7.3 G/DL (ref 6.4–8.3)
PSA SERPL DL<=0.01 NG/ML-MCNC: 3.82 NG/ML (ref 0–6.5)
RBC # BLD AUTO: 5.12 10E6/UL (ref 4.4–5.9)
SODIUM SERPL-SCNC: 143 MMOL/L (ref 136–145)
WBC # BLD AUTO: 10.6 10E3/UL (ref 4–11)

## 2023-05-18 PROCEDURE — 85027 COMPLETE CBC AUTOMATED: CPT | Performed by: INTERNAL MEDICINE

## 2023-05-18 PROCEDURE — 36415 COLL VENOUS BLD VENIPUNCTURE: CPT | Performed by: INTERNAL MEDICINE

## 2023-05-18 PROCEDURE — 80053 COMPREHEN METABOLIC PANEL: CPT | Performed by: INTERNAL MEDICINE

## 2023-05-18 PROCEDURE — G0439 PPPS, SUBSEQ VISIT: HCPCS | Performed by: INTERNAL MEDICINE

## 2023-05-18 PROCEDURE — 99214 OFFICE O/P EST MOD 30 MIN: CPT | Mod: 25 | Performed by: INTERNAL MEDICINE

## 2023-05-18 PROCEDURE — G0103 PSA SCREENING: HCPCS | Performed by: INTERNAL MEDICINE

## 2023-05-18 PROCEDURE — 80061 LIPID PANEL: CPT | Performed by: INTERNAL MEDICINE

## 2023-05-18 ASSESSMENT — ENCOUNTER SYMPTOMS
PARESTHESIAS: 1
HEMATURIA: 0
HEMATOCHEZIA: 0
COUGH: 1
CHILLS: 0
DIARRHEA: 0
HEADACHES: 0
ABDOMINAL PAIN: 0
WEAKNESS: 1
SHORTNESS OF BREATH: 0
NERVOUS/ANXIOUS: 0
FREQUENCY: 1
MYALGIAS: 1
DIZZINESS: 1
DYSURIA: 0
PALPITATIONS: 0
SORE THROAT: 0
ARTHRALGIAS: 1
HEARTBURN: 0
EYE PAIN: 1
FEVER: 0
JOINT SWELLING: 0
CONSTIPATION: 0
NAUSEA: 0

## 2023-05-18 ASSESSMENT — ACTIVITIES OF DAILY LIVING (ADL)
CURRENT_FUNCTION: TRANSPORTATION REQUIRES ASSISTANCE
CURRENT_FUNCTION: MONEY MANAGEMENT REQUIRES ASSISTANCE
CURRENT_FUNCTION: LAUNDRY REQUIRES ASSISTANCE
CURRENT_FUNCTION: MEDICATION ADMINISTRATION REQUIRES ASSISTANCE
CURRENT_FUNCTION: BATHING REQUIRES ASSISTANCE
CURRENT_FUNCTION: HOUSEWORK REQUIRES ASSISTANCE
CURRENT_FUNCTION: PREPARING MEALS REQUIRES ASSISTANCE
CURRENT_FUNCTION: SHOPPING REQUIRES ASSISTANCE

## 2023-05-18 ASSESSMENT — PATIENT HEALTH QUESTIONNAIRE - PHQ9
SUM OF ALL RESPONSES TO PHQ QUESTIONS 1-9: 0
10. IF YOU CHECKED OFF ANY PROBLEMS, HOW DIFFICULT HAVE THESE PROBLEMS MADE IT FOR YOU TO DO YOUR WORK, TAKE CARE OF THINGS AT HOME, OR GET ALONG WITH OTHER PEOPLE: NOT DIFFICULT AT ALL
SUM OF ALL RESPONSES TO PHQ QUESTIONS 1-9: 0

## 2023-05-18 ASSESSMENT — PAIN SCALES - GENERAL: PAINLEVEL: NO PAIN (0)

## 2023-05-18 NOTE — PATIENT INSTRUCTIONS
Patient Education   Personalized Prevention Plan  You are due for the preventive services outlined below.  Your care team is available to assist you in scheduling these services.  If you have already completed any of these items, please share that information with your care team to update in your medical record.  Health Maintenance Due   Topic Date Due    ANNUAL REVIEW OF  ORDERS  Never done    Zoster (Shingles) Vaccine (2 of 3) 12/31/2010    COVID-19 Vaccine (5 - Pfizer series) 03/07/2023     Your Health Risk Assessment indicates you feel you are not in good health    A healthy lifestyle helps keep the body fit and the mind alert. It helps protect you from disease, helps you fight disease, and helps prevent chronic disease (disease that doesn't go away) from getting worse. This is important as you get older and begin to notice twinges in muscles and joints and a decline in the strength and stamina you once took for granted. A healthy lifestyle includes good healthcare, good nutrition, weight control, recreation, and regular exercise. Avoid harmful substances and do what you can to keep safe. Another part of a healthy lifestyle is stay mentally active and socially involved.    Good healthcare   Have a wellness visit every year.   If you have new symptoms, let us know right away. Don't wait until the next checkup.   Take medicines exactly as prescribed and keep your medicines in a safe place. Tell us if your medicine causes problems.   Healthy diet and weight control   Eat 3 or 4 small, nutritious, low-fat, high-fiber meals a day. Include a variety of fruits, vegetables, and whole-grain foods.   Make sure you get enough calcium in your diet. Calcium, vitamin D, and exercise help prevent osteoporosis (bone thinning).   If you live alone, try eating with others when you can. That way you get a good meal and have company while you eat it.   Try to keep a healthy weight. If you eat more calories than your body uses  for energy, it will be stored as fat and you will gain weight.     Recreation   Recreation is not limited to sports and team events. It includes any activity that provides relaxation, interest, enjoyment, and exercise. Recreation provides an outlet for physical, mental, and social energy. It can give a sense of worth and achievement. It can help you stay healthy.    Mental Exercise and Social Involvement  Mental and emotional health is as important as physical health. Keep in touch with friends and family. Stay as active as possible. Continue to learn and challenge yourself.   Things you can do to stay mentally active are:  Learn something new, like a foreign language or musical instrument.   Play SCRABBLE or do crossword puzzles. If you cannot find people to play these games with you at home, you can play them with others on your computer through the Internet.   Join a games club--anything from card games to chess or checkers or lawn bowling.   Start a new hobby.   Go back to school.   Volunteer.   Read.   Keep up with world events.    Exercise for a Healthier Heart  You may wonder how you can improve the health of your heart. If you re thinking about exercise, you re on the right track. You don t need to become an athlete. But you do need a certain amount of brisk exercise to help strengthen your heart. If you have been diagnosed with a heart condition, your healthcare provider may advise exercise to help your condition. To help make exercise a habit, choose safe, fun activities.      Exercise with a friend. When activity is fun, you're more likely to stick with it.     Before you start  Check with your healthcare provider before starting an exercise program. This is especially important if you haven't been active for a while. It's also important if you have a long-term (chronic) health problem such as heart disease, diabetes, or obesity. Also check with your provider if you're at high risk for having these  problems.   Why exercise?  Exercising regularly offers many healthy rewards. It can help you do all of these:   Improve your blood cholesterol level to help prevent further heart trouble.  Lower your blood pressure to help prevent a stroke or heart attack.  Control diabetes or reduce your risk of getting this disease.  Improve your heart and lung function.  Reach and stay at a healthy weight.  Make your muscles stronger so you can stay active.  Prevent falls and fractures by slowing the loss of bone mass (osteoporosis).  Manage stress better.  Improve your sense of self and your body image.  Exercise tips    Ease into your routine. Set small goals. Then build on them. Talk with your healthcare provider first before starting an exercise routine if you're not sure what your activity level should be.  Exercise on most days. Aim for a total of at least 150 minutes (2 hours and 30 minutes) or more of moderate-intensity aerobic activity each week. You could also do 75 minutes (1 hour and 15 minutes) or more of vigorous-intensity aerobic activity each week. Or try for a combination of both. Moderate activity means that you breathe heavier and your heart rate increases, but you can still talk. Think about doing at least 30 minutes of moderate exercise, 5 times a week. It's OK to work up to the 30-minute period over time. Examples of moderate-intensity activity are brisk walking, gardening, and water aerobics.  Step up your daily activity level.  Along with your exercise program, try being more active the whole day. Walk instead of drive. Or park further away so that you take more steps each day. Do more household tasks or yard work. You may not be able to meet the advised amount of physical activity. But doing some moderate- or vigorous-intensity aerobic activity can help reduce your risk for heart disease. Your healthcare provider can help you figure out what is best for you.  Choose 1 or more activities you enjoy.   Walking is one of the easiest things you can do. You can also try swimming, riding a bike, dancing, or taking an exercise class.    Call 911  Call 911 right away if any of these occur:   Chest pain that doesn't go away quickly with rest  New burning, tightness, pressure, or heaviness in your chest, neck, shoulders, back, or arms  Abnormal or severe shortness of breath  A very fast or irregular heartbeat (palpitations)  Fainting  When to call your healthcare provider  Call your healthcare provider if you have any of these:   Dizziness or lightheadedness  Mild shortness of breath or chest pain  Increased or new joint or muscle pain    [employer] last reviewed this educational content on 7/1/2022 2000-2022 [employer]. All rights reserved. This information is not intended as a substitute for professional medical care. Always follow your healthcare professional's instructions.        Activities of Daily Living    Your Health Risk Assessment indicates you have difficulties with activities of daily living such as housework, bathing, preparing meals, taking medication, etc. Please make a follow up appointment for us to address this issue in more detail.    Signs of Hearing Loss  Hearing loss is a problem shared by many people. In fact, it's one of the most common health problems, particularly as people age. Most people aged 65 and older have some hearing loss. By age 80, almost everyone does. Hearing loss often occurs slowly over the years. So, you may not realize your hearing has gotten worse.   When sudden hearing loss occurs, it's important to contact your healthcare provider right away. Your provider will do a medical exam and a hearing exam as soon as possible. This is to help find the cause and type of your sudden hearing loss. Based on your diagnosis, your healthcare provider will discuss possible treatments.      Hearing much better with one ear can be a sign of hearing loss.     Have your hearing  checked  Call your healthcare provider if you:   Have to strain to hear normal conversation  Have to watch other people s faces very carefully to follow what they re saying  Need to ask people to repeat what they ve said  Often misunderstand what people are saying  Turn the volume of the television or radio up so high that others complain  Feel that people are mumbling when they re talking to you  Find that the effort to hear leaves you feeling tired and irritated  Notice, when using the phone, that you hear better with one ear than the other  [employer] last reviewed this educational content on 6/1/2022 2000-2022 [employer]. All rights reserved. This information is not intended as a substitute for professional medical care. Always follow your healthcare professional's instructions.          Urinary Incontinence (Male)  We understand that gender is a spectrum. We may use gendered terms to talk about anatomy and health risk. Please use this sheet in a way that works best for you and your provider as you talk about your care.     Urinary incontinence means not being able to control the release of urine from the bladder.   Causes  Common causes of urinary incontinence in men include:  Infection  Certain medicines  Aging  Poor pelvic muscle tone  Bladder spasms  Obesity  Enlarged prostate  Trouble urinating and fully emptying the bladder (urinary retention)  Other things that can cause incontinence are:   Nervous system diseases  Diabetes  Sleep apnea  Urinary tract infections  Prostate surgery  Pelvic injury  Constipation and smoking have also been identified as risk factors.   Symptoms  Urge incontinence (overactive bladder). This is a sudden urge to urinate. It occurs even though there may not be much urine in the bladder. The need to urinate often during the night is common. It's due to overactive bladder muscles.  Stress incontinence. This is urine leakage that you can't control. It can occur with  sneezing, coughing, and other actions that put stress on the bladder.    Treatment  Treatment depends on what is causing the condition. Bladder infections are treated with antibiotics. Urinary retention is treated with a bladder catheter.   Home care  Follow these guidelines when caring for yourself at home:  Don't have any foods and drinks that may irritate the bladder. This includes:  Chocolate  Alcohol  Caffeine  Carbonated drinks  Acidic fruits and juices  Limit fluids to 6 to 8 cups a day.  Lose weight if you are overweight. This may reduce your symptoms.  If advised, do regular pelvic muscle-strengthening exercises such as Kegel exercises.  If needed, wear absorbent pads to catch urine. Change the pads often. This is for good hygiene and to prevent skin and bladder infections.  Bathe daily for good hygiene.  If an antibiotic was prescribed to treat a bladder infection, take it until it's finished. Keep taking it even if you are feeling better. This is to make sure your infection has cleared.  If a catheter was left in place, keep bacteria from getting into the collection bag. Don't disconnect the catheter from the collection bag.  Use a leg band to secure the catheter drainage tube, so it does not pull on the catheter. Drain the collection bag when it becomes full. To do this, use the drain spout at the bottom of the bag. Don't disconnect the bag from the catheter.  Don't pull on or try to remove a catheter. The catheter must be removed by a healthcare provider.  If you smoke, stop. Ask your provider for help if you can't do this on your own.  Follow-up care  Follow up with your healthcare provider, or as advised.  When to get medical advice  Call your healthcare provider right away if any of these occur:   Fever over 100.4 F (38 C), or as directed by your provider  Bladder pain or fullness  Belly swelling, nausea, or vomiting  Back pain  Weakness, dizziness, or fainting  If a catheter was left in place,  return if:  The catheter falls out  The catheter stops draining for 6 hours  Your urine gets cloudy or smells bad  [employer] last reviewed this educational content on 6/1/2022 2000-2022 [employer]. All rights reserved. This information is not intended as a substitute for professional medical care. Always follow your healthcare professional's instructions.        Your Health Risk Assessment indicates you feel you are not in good emotional health.    Recreation   Recreation is not limited to sports and team events. It includes any activity that provides relaxation, interest, enjoyment, and exercise. Recreation provides an outlet for physical, mental, and social energy. It can give a sense of worth and achievement. It can help you stay healthy.    Mental Exercise and Social Involvement  Mental and emotional health is as important as physical health. Keep in touch with friends and family. Stay as active as possible. Continue to learn and challenge yourself.   Things you can do to stay mentally active are:  Learn something new, like a foreign language or musical instrument.   Play SCRABBLE or do crossword puzzles. If you cannot find people to play these games with you at home, you can play them with others on your computer through the Internet.   Join a games club--anything from card games to chess or checkers or lawn bowling.   Start a new hobby.   Go back to school.   Volunteer.   Read.   Keep up with world events.

## 2023-05-18 NOTE — PROGRESS NOTES
"Patient has been advised of split billing requirements and indicates understanding: Yes  Are you in the first 12 months of your Medicare coverage?  No    Healthy Habits:     In general, how would you rate your overall health?  Fair    Frequency of exercise:  None    Do you usually eat at least 4 servings of fruit and vegetables a day, include whole grains    & fiber and avoid regularly eating high fat or \"junk\" foods?  Yes    Taking medications regularly:  Yes    Medication side effects:  None    Ability to successfully perform activities of daily living:  Transportation requires assistance, shopping requires assistance, preparing meals requires assistance, housework requires assistance, bathing requires assistance, laundry requires assistance, medication administration requires assistance and money management requires assistance    Home Safety:  No safety concerns identified    Hearing Impairment:  Difficulty following a conversation in a noisy restaurant or crowded room    In the past 6 months, have you been bothered by leaking of urine? Yes    In general, how would you rate your overall mental or emotional health?  Fair      PHQ-2 Total Score: 0    Additional concerns today:  No        Have you ever done Advance Care Planning? (For example, a Health Directive, POLST, or a discussion with a medical provider or your loved ones about your wishes): Yes, advance care planning is on file.     Answers for HPI/ROS submitted by the patient on 5/18/2023  If you checked off any problems, how difficult have these problems made it for you to do your work, take care of things at home, or get along with other people?: Not difficult at all  PHQ9 TOTAL SCORE: 0      Fall risk  Fallen 2 or more times in the past year?: No  Any fall with injury in the past year?: No    Cognitive Screening No new cognitive deficits noted.    Review of Systems   Constitutional: Negative for chills and fever.   HENT: Positive for hearing loss. " Negative for congestion, ear pain and sore throat.    Eyes: Positive for pain. Negative for visual disturbance.   Respiratory: Positive for cough. Negative for shortness of breath.    Cardiovascular: Negative for chest pain, palpitations and peripheral edema.   Gastrointestinal: Negative for abdominal pain, constipation, diarrhea, heartburn, hematochezia and nausea.   Genitourinary: Positive for frequency and urgency. Negative for dysuria, genital sores, hematuria, impotence and penile discharge.   Musculoskeletal: Positive for arthralgias and myalgias. Negative for joint swelling.   Skin: Negative for rash.   Neurological: Positive for dizziness, weakness and paresthesias. Negative for headaches.   Psychiatric/Behavioral: Positive for mood changes. The patient is not nervous/anxious.

## 2023-05-18 NOTE — PROGRESS NOTES
[facility] - Primary [facility] Specialists    Comprehensive and complex medical care - Chronic disease management - Shared decision making - Care coordination - Compassionate care    Patient advocacy - Rational deprescribing - Minimally disruptive medicine - Ethical focus - Customized care         Date of Service: 5/18/2023  Primary Provider: Juan Francisco Chen    Patient Care Team:  Juan Francisco Chen MD as PCP - General  Juan Francisco Chen MD as Assigned PCP  Rocco Solis MD as MD          Patient's Pharmacy:    [facility] - [facility] [address]  [address] [address]  Phone: [phone] Fax: [phone]    [facility] - [facility] [address]  [address] [facility] [address]  Phone: [phone] Fax: [phone]     Patient's Contacts:  Name Home Phone Work Phone Mobile Phone Relationship Lgl GrRODRIGO Rodríguez [phone]   Daughter VERONA Briggs [phone]   Spouse No     Patient's Insurance:    Payor: MEDICARE / Plan: MEDICARE / Product Type: Medicare /      Subjective:     History of present illness:    Aguilar Garcia is an 73 year old here for an annual wellness visit.    The issues he would like to address at today's visit include the following:    Chief Complaint   Patient presents with     Physical     Follow Up     Pt would like to follow up on a few health issues. Stroke, high cholesterol, hypertension and urinary issues          5/18/2023    11:33 AM   Additional Questions   Roomed by James KHAN   Accompanied by N/A     Patient comes in today for annual wellness visit and for other issues.    We reviewed his history of stroke.  He has not had any sudden worsening.  He has left hemiplegia especially related to his left arm.    His wife has been diagnosed with bladder cancer and with lung cancer.  This has been a bit more challenging for them in the last 6 to 12 months.    We  reviewed his aortic valve replacement and his breathing has been good.    His knee arthritis does not bother him too much.  He does have some left shoulder pain and some left wrist pain.    He uses a cane with his right arm.  He is able to walk with this.    He does note some issues with constipation.  He uses MiraLAX once a day for this.  He uses Senokot only as needed.  It can be difficult for him to pass his stool.  He could consider a change in therapy if needed.    We reviewed his BPH and he has some urgency at times.  He does feel like he has to go a lot.  It is hard for him to pass his urine at times.    We reviewed his other issues noted in the assessment but not specifically addressed in the HPI above.           Active Problem List:  Problem List as of 5/18/2023 Reviewed: 5/18/2023  5:11 PM by Juan Francisco Chen MD       High    Nonsmoker    Full code status    CVA (cerebral vascular accident) (H)    Hemiplegia as late effect of cerebrovascular accident (CVA) (H)    S/P aortic valve replacement with bioprosthetic valve       Medium    Osteoarthritis of both knees    Chronic constipation       Low    BPH (benign prostatic hyperplasia)    HLD (hyperlipidemia)    ED (erectile dysfunction)    Chronic iritis of right eye    Dysphagia as late effect of cerebrovascular accident (CVA)        Past Medical History:   Diagnosis Date     Aortic stenosis      BPH (benign prostatic hyperplasia) 8/2/2020     ED (erectile dysfunction)      H/O iritis      HLD (hyperlipidemia)      Mild depression 8/2/2020     Nonsmoker      Osteoarthritis of both knees      Past Surgical History:   Procedure Laterality Date     ARTHROSCOPY KNEE       HAND SURGERY       IR CAROTID CEREBRAL ANGIOGRAM BILATERAL  9/7/2019     THROMBECTOMY W/ EMBOLECTOMY  09/06/2019     TISSUE AORTIC VALVE REPLACEMENT  2020     Family History   Problem Relation Age of Onset     Heart Failure Mother      Rheumatoid Arthritis Mother      Other - See Comments Mother          Temporal arteritis     Coronary Artery Disease Father      No Known Problems Daughter      No Known Problems Daughter      Melanoma Son      Family history is otherwise noncontributory.     Social History     Occupational History     Not on file   Tobacco Use     Smoking status: Never     Smokeless tobacco: Never   Vaping Use     Vaping status: Not on file   Substance and Sexual Activity     Alcohol use: Yes     Drug use: Not on file     Sexual activity: Not on file      Social History     Social History Narrative    Patient of Dr. Chen since 2015.  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    [employer] in [place].      Current Outpatient Medications   Medication Instructions     amoxicillin (AMOXIL) 2,000 mg, Oral, ONCE PRN     aspirin (ASA) 81 mg, DAILY     atorvastatin (LIPITOR) 80 MG tablet TAKE 1 TABLET EVERY DAY     cholecalciferol, vitamin D3, 2,000 unit Tab [CHOLECALCIFEROL, VITAMIN D3, 2,000 UNIT TAB] Take by mouth.     metoprolol succinate ER (TOPROL XL) 25 MG 24 hr tablet TAKE 1 TABLET EVERY DAY     tamsulosin (FLOMAX) 0.4 MG capsule TAKE 1 CAPSULE DAILY AFTER SUPPER.     vitamin B-12 (CYANOCOBALAMIN) 1,000 mcg     Allergies: Sulfa (sulfonamide antibiotics) [sulfa antibiotics]     Immunization History   Administered Date(s) Administered     COVID-19 Bivalent 12+ (Pfizer) 11/07/2022     COVID-19 MONOVALENT 12+ (Pfizer) 01/29/2021, 02/18/2021, 09/27/2021     FLUAD(HD)65+ QUAD 09/10/2020, 09/20/2021     Flu 65+ Years 10/09/2017     Flu, Unspecified 10/08/2015, 10/01/2018, 09/20/2020     Influenza (High Dose) 3 valent vaccine 10/07/2015, 10/20/2016     Influenza Vaccine 65+ (Fluzone HD) 11/07/2022     Influenza Vaccine >6 months (Alfuria,Fluzone) 09/29/2019     Pneumo Conj 13-V (2010&after) 10/07/2015     Pneumococcal 23 valent 10/01/2014     TD,PF 7+ (Tenivac) 07/31/2020     Td (Adult), Adsorbed 11/05/2010     Td,adult,historic,unspecified 11/05/2010     Zoster vaccine, live 11/05/2010      Objective:     Wt  "Readings from Last 3 Encounters:   05/18/23 73.9 kg (163 lb)   05/05/22 75 kg (165 lb 6.4 oz)   05/24/21 75.3 kg (166 lb)     BP Readings from Last 3 Encounters:   05/18/23 110/79   05/05/22 116/74   05/24/21 116/70       PHYSICAL EXAM  /79 (BP Location: Right arm, Patient Position: Sitting, Cuff Size: Adult Regular)   Pulse 79   Temp 97.9  F (36.6  C) (Oral)   Resp 16   Ht 1.753 m (5' 9\")   Wt 73.9 kg (163 lb)   SpO2 96%   BMI 24.07 kg/m     The patient is comfortable, no acute distress.  Mood good.  Insight is good.  No skin lesions or nodules of concern.  Ears clear.  Eyes are nonicteric.  Pupils equal and reactive.  Throat is clear.  Neck is supple without mass, no thyromegaly. No cervical or epitrochlear adenopathy.  Heart regular rate and rhythm.  Lungs clear to auscultation bilaterally.  Respiratory effort good.  Abdomen soft and nontender.  No hepatosplenomegaly.  Extremities show no edema.  Still significant left upper extremity hemiplegia.  Has AFO on left leg.       Diagnostics:     Office Visit on 05/05/2022   Component Date Value Ref Range Status     Cholesterol 05/05/2022 126  <=199 mg/dL Final     Triglycerides 05/05/2022 82  <=149 mg/dL Final     Direct Measure HDL 05/05/2022 52  >=40 mg/dL Final    HDL Cholesterol Reference Range:     0-2 years:   No reference ranges established for patients under 2 years old  [facility] lipid analytes.    2-8 years:  Greater than 45 mg/dL     18 years and older:   Female: Greater than or equal to 50 mg/dL   Male:   Greater than or equal to 40 mg/dL     LDL Cholesterol Calculated 05/05/2022 58  <=129 mg/dL Final     Patient Fasting > 8hrs? 05/05/2022 Yes   Final     Sodium 05/05/2022 143  136 - 145 mmol/L Final     Potassium 05/05/2022 4.2  3.5 - 5.0 mmol/L Final     Chloride 05/05/2022 106  98 - 107 mmol/L Final     Carbon Dioxide (CO2) 05/05/2022 23  22 - 31 mmol/L Final     Anion Gap 05/05/2022 14  5 - 18 mmol/L Final     Urea " Nitrogen 05/05/2022 16  8 - 28 mg/dL Final     Creatinine 05/05/2022 1.03  0.70 - 1.30 mg/dL Final     Calcium 05/05/2022 9.8  8.5 - 10.5 mg/dL Final     Glucose 05/05/2022 105  70 - 125 mg/dL Final     Alkaline Phosphatase 05/05/2022 110  45 - 120 U/L Final     AST 05/05/2022 29  0 - 40 U/L Final     ALT 05/05/2022 31  0 - 45 U/L Final     Protein Total 05/05/2022 7.0  6.0 - 8.0 g/dL Final     Albumin 05/05/2022 4.3  3.5 - 5.0 g/dL Final     Bilirubin Total 05/05/2022 0.9  0.0 - 1.0 mg/dL Final     GFR Estimate 05/05/2022 77  >60 mL/min/1.73m2 Final    Effective December 21, 2021 eGFRcr in adults is calculated using the 2021 CKD-EPI creatinine equation which includes age and gender (Nimco crespo al., [place], DOI: 10.1056/GWMDga9722519)     WBC Count 05/05/2022 8.8  4.0 - 11.0 10e3/uL Final     RBC Count 05/05/2022 5.19  4.40 - 5.90 10e6/uL Final     Hemoglobin 05/05/2022 16.4  13.3 - 17.7 g/dL Final     Hematocrit 05/05/2022 48.4  40.0 - 53.0 % Final     MCV 05/05/2022 93  78 - 100 fL Final     MCH 05/05/2022 31.6  26.5 - 33.0 pg Final     MCHC 05/05/2022 33.9  31.5 - 36.5 g/dL Final     RDW 05/05/2022 13.4  10.0 - 15.0 % Final     Platelet Count 05/05/2022 187  150 - 450 10e3/uL Final        Results for orders placed or performed in visit on 05/18/23   CBC with platelets     Status: Normal   Result Value Ref Range    WBC Count 10.6 4.0 - 11.0 10e3/uL    RBC Count 5.12 4.40 - 5.90 10e6/uL    Hemoglobin 16.5 13.3 - 17.7 g/dL    Hematocrit 48.9 40.0 - 53.0 %    MCV 96 78 - 100 fL    MCH 32.2 26.5 - 33.0 pg    MCHC 33.7 31.5 - 36.5 g/dL    RDW 13.7 10.0 - 15.0 %    Platelet Count 176 150 - 450 10e3/uL        Assessment:     1. Encounter for Medicare annual wellness exam    2. Benign prostatic hyperplasia with urinary obstruction    3. Essential hypertension    4. Mixed hyperlipidemia    5. Screening for prostate cancer    6. Hemiplegia as late effect of cerebrovascular accident (CVA) (H) .  This is stable at this time.  No  worsening.  No deformity seen.  Continue to monitor.   7. Cerebrovascular accident (CVA) due to embolism of right middle cerebral artery (H)   No signs of recurrence.  Continue to monitor.   8. S/P aortic valve replacement with bioprosthetic valve    9. Chronic constipation    10. Primary osteoarthritis of both knees         Plan:     1. Blood work done today.  2. Could consider increasing Flomax for urination to see if this would help.  3. Could consider increasing MiraLAX or adding Linzess or using Linzess for bowels to see if it would work any better for him.  4. Continue current medications.  5. Follow up sooner if issues.    Orders Placed This Encounter   Procedures     Comprehensive metabolic panel     Lipid panel reflex to direct LDL Non-fasting     CBC with platelets     Prostate Specific Antigen Screen        A personalized health plan based on the identified health risks was provided to the patient on the AVS.       Juan Francisco Chen MD  General Internal Medicine  [facility]    Return in about 1 year (around 5/18/2024) for annual wellness visit, visit and blood work.     No future appointments.      Health Maintenance   Topic Date Due     ANNUAL REVIEW OF  ORDERS  Never done     ZOSTER IMMUNIZATION (2 of 3) 12/31/2010     COVID-19 Vaccine (5 - Pfizer series) 03/07/2023     DTAP/TDAP/TD IMMUNIZATION (1 - Tdap) 05/05/2032 (Originally 8/1/2020)     PHQ-9  11/18/2023     MEDICARE ANNUAL WELLNESS VISIT  05/18/2024     FALL RISK ASSESSMENT  05/18/2024     LIPID  05/05/2027     COLORECTAL CANCER SCREENING  06/01/2027     ADVANCE CARE PLANNING  05/18/2028     INFLUENZA VACCINE  Completed     Pneumococcal Vaccine: 65+ Years  Completed     DEPRESSION ACTION PLAN  Addressed     IPV IMMUNIZATION  Aged Out     MENINGITIS IMMUNIZATION  Aged Out     HEPATITIS C SCREENING  Discontinued     AORTIC ANEURYSM SCREENING (SYSTEM ASSIGNED)  Discontinued        I have reviewed Opioid Use Disorder and  Substance Use Disorder risk factors and made any needed referrals.  This may not apply to this individual patient, but this documentation is required by Medicare.    The patient was provided with suggestions to help him develop a healthy physical lifestyle.  He is at risk for lack of exercise and has been provided with information to increase physical activity for the benefit of his well-being.  The patient reports that he has difficulty with activities of daily living. I have asked that the patient make a follow up appointment in 52 weeks where this issue will be further evaluated and addressed.  The patient was provided with written information regarding signs of hearing loss.  Information on urinary incontinence and treatment options given to patient.  The patient was provided with suggestions to help him develop a healthy emotional lifestyle.

## 2023-05-19 LAB
CHOLEST SERPL-MCNC: 130 MG/DL
HDLC SERPL-MCNC: 51 MG/DL
LDLC SERPL CALC-MCNC: 62 MG/DL
NONHDLC SERPL-MCNC: 79 MG/DL
TRIGL SERPL-MCNC: 83 MG/DL

## 2023-11-11 DIAGNOSIS — E78.2 MIXED HYPERLIPIDEMIA: ICD-10-CM

## 2023-11-11 DIAGNOSIS — I10 ESSENTIAL HYPERTENSION: ICD-10-CM

## 2023-11-11 DIAGNOSIS — N40.1 BENIGN PROSTATIC HYPERPLASIA WITH URINARY OBSTRUCTION: ICD-10-CM

## 2023-11-11 DIAGNOSIS — N13.8 BENIGN PROSTATIC HYPERPLASIA WITH URINARY OBSTRUCTION: ICD-10-CM

## 2023-11-13 RX ORDER — ATORVASTATIN CALCIUM 80 MG/1
TABLET, FILM COATED ORAL
Qty: 90 TABLET | Refills: 1 | Status: SHIPPED | OUTPATIENT
Start: 2023-11-13 | End: 2024-05-17

## 2023-11-13 RX ORDER — TAMSULOSIN HYDROCHLORIDE 0.4 MG/1
CAPSULE ORAL
Qty: 90 CAPSULE | Refills: 1 | Status: SHIPPED | OUTPATIENT
Start: 2023-11-13 | End: 2024-05-17

## 2023-11-13 RX ORDER — METOPROLOL SUCCINATE 25 MG/1
TABLET, EXTENDED RELEASE ORAL
Qty: 90 TABLET | Refills: 1 | Status: SHIPPED | OUTPATIENT
Start: 2023-11-13 | End: 2024-05-17

## 2024-05-10 SDOH — HEALTH STABILITY: PHYSICAL HEALTH: ON AVERAGE, HOW MANY MINUTES DO YOU ENGAGE IN EXERCISE AT THIS LEVEL?: 0 MIN

## 2024-05-10 SDOH — HEALTH STABILITY: PHYSICAL HEALTH: ON AVERAGE, HOW MANY DAYS PER WEEK DO YOU ENGAGE IN MODERATE TO STRENUOUS EXERCISE (LIKE A BRISK WALK)?: 0 DAYS

## 2024-05-10 ASSESSMENT — SOCIAL DETERMINANTS OF HEALTH (SDOH): HOW OFTEN DO YOU GET TOGETHER WITH FRIENDS OR RELATIVES?: TWICE A WEEK

## 2024-05-16 ASSESSMENT — PATIENT HEALTH QUESTIONNAIRE - PHQ9
SUM OF ALL RESPONSES TO PHQ QUESTIONS 1-9: 2
SUM OF ALL RESPONSES TO PHQ QUESTIONS 1-9: 2
10. IF YOU CHECKED OFF ANY PROBLEMS, HOW DIFFICULT HAVE THESE PROBLEMS MADE IT FOR YOU TO DO YOUR WORK, TAKE CARE OF THINGS AT HOME, OR GET ALONG WITH OTHER PEOPLE: NOT DIFFICULT AT ALL

## 2024-05-17 ENCOUNTER — OFFICE VISIT (OUTPATIENT)
Dept: INTERNAL MEDICINE | Facility: CLINIC | Age: 74
End: 2024-05-17
Payer: MEDICARE

## 2024-05-17 VITALS
TEMPERATURE: 98 F | DIASTOLIC BLOOD PRESSURE: 68 MMHG | BODY MASS INDEX: 24.9 KG/M2 | HEIGHT: 69 IN | RESPIRATION RATE: 20 BRPM | WEIGHT: 168.1 LBS | OXYGEN SATURATION: 93 % | SYSTOLIC BLOOD PRESSURE: 100 MMHG | HEART RATE: 82 BPM

## 2024-05-17 DIAGNOSIS — E78.2 MIXED HYPERLIPIDEMIA: ICD-10-CM

## 2024-05-17 DIAGNOSIS — Z95.3 S/P AORTIC VALVE REPLACEMENT WITH BIOPROSTHETIC VALVE: ICD-10-CM

## 2024-05-17 DIAGNOSIS — N40.1 BENIGN PROSTATIC HYPERPLASIA WITH URINARY OBSTRUCTION: ICD-10-CM

## 2024-05-17 DIAGNOSIS — I10 PRIMARY HYPERTENSION: Chronic | ICD-10-CM

## 2024-05-17 DIAGNOSIS — K59.09 CHRONIC CONSTIPATION: ICD-10-CM

## 2024-05-17 DIAGNOSIS — I63.411 CEREBROVASCULAR ACCIDENT (CVA) DUE TO EMBOLISM OF RIGHT MIDDLE CEREBRAL ARTERY (H): Chronic | ICD-10-CM

## 2024-05-17 DIAGNOSIS — M17.0 PRIMARY OSTEOARTHRITIS OF BOTH KNEES: ICD-10-CM

## 2024-05-17 DIAGNOSIS — I10 ESSENTIAL HYPERTENSION: ICD-10-CM

## 2024-05-17 DIAGNOSIS — I69.359 HEMIPLEGIA AS LATE EFFECT OF CEREBROVASCULAR ACCIDENT (CVA) (H): Primary | ICD-10-CM

## 2024-05-17 DIAGNOSIS — N13.8 BENIGN PROSTATIC HYPERPLASIA WITH URINARY OBSTRUCTION: ICD-10-CM

## 2024-05-17 LAB
ERYTHROCYTE [DISTWIDTH] IN BLOOD BY AUTOMATED COUNT: 13.6 % (ref 10–15)
HCT VFR BLD AUTO: 48.4 % (ref 40–53)
HGB BLD-MCNC: 16.5 G/DL (ref 13.3–17.7)
MCH RBC QN AUTO: 31.9 PG (ref 26.5–33)
MCHC RBC AUTO-ENTMCNC: 34.1 G/DL (ref 31.5–36.5)
MCV RBC AUTO: 93 FL (ref 78–100)
PLATELET # BLD AUTO: 175 10E3/UL (ref 150–450)
RBC # BLD AUTO: 5.18 10E6/UL (ref 4.4–5.9)
WBC # BLD AUTO: 8.3 10E3/UL (ref 4–11)

## 2024-05-17 PROCEDURE — G2211 COMPLEX E/M VISIT ADD ON: HCPCS | Performed by: INTERNAL MEDICINE

## 2024-05-17 PROCEDURE — 80053 COMPREHEN METABOLIC PANEL: CPT | Performed by: INTERNAL MEDICINE

## 2024-05-17 PROCEDURE — 36415 COLL VENOUS BLD VENIPUNCTURE: CPT | Performed by: INTERNAL MEDICINE

## 2024-05-17 PROCEDURE — 85027 COMPLETE CBC AUTOMATED: CPT | Performed by: INTERNAL MEDICINE

## 2024-05-17 PROCEDURE — 80061 LIPID PANEL: CPT | Performed by: INTERNAL MEDICINE

## 2024-05-17 PROCEDURE — 99214 OFFICE O/P EST MOD 30 MIN: CPT | Performed by: INTERNAL MEDICINE

## 2024-05-17 RX ORDER — TAMSULOSIN HYDROCHLORIDE 0.4 MG/1
0.4 CAPSULE ORAL DAILY
Qty: 90 CAPSULE | Refills: 3 | Status: SHIPPED | OUTPATIENT
Start: 2024-05-17 | End: 2024-08-16

## 2024-05-17 RX ORDER — METOPROLOL SUCCINATE 25 MG/1
25 TABLET, EXTENDED RELEASE ORAL DAILY
Qty: 90 TABLET | Refills: 3 | Status: SHIPPED | OUTPATIENT
Start: 2024-05-17 | End: 2024-08-16

## 2024-05-17 RX ORDER — ATORVASTATIN CALCIUM 80 MG/1
80 TABLET, FILM COATED ORAL DAILY
Qty: 90 TABLET | Refills: 3 | Status: SHIPPED | OUTPATIENT
Start: 2024-05-17 | End: 2024-08-16

## 2024-05-17 ASSESSMENT — PAIN SCALES - GENERAL: PAINLEVEL: MODERATE PAIN (5)

## 2024-05-17 NOTE — PROGRESS NOTES
[facility] - [facility] Specialists    Comprehensive and complex medical care - Chronic disease management - Shared decision making - Care coordination - Compassionate care    Patient advocacy - Rational deprescribing - Minimally disruptive medicine - Ethical focus - Customized care         Date of Service: 5/17/2024  Primary Provider: Juan Francisco Chen    Patient Care Team:  Juan Francisco Chen MD as PCP - General  Juan Francisco Chen MD as Assigned PCP  Rocco Solis MD as MD          Patient's Pharmacy:    [facility] - [facility] [address]  [address] [facility] [address]  Phone: [phone] Fax: [phone]    [facility] - [facility] [address]  [address] [address]  Phone: [phone] Fax: [phone]     Patient's Contacts:  Name Home Phone Work Phone Mobile Phone Relationship Lgl Grd   RODRIGO MONROY [phone]   Daughter No   VERONA MONROY [phone]   Spouse No     Patient's Insurance:    Payor: MEDICARE / Plan: MEDICARE / Product Type: Medicare /            Active Problem List:  Problem List as of 5/17/2024 Reviewed: 5/17/2024  2:06 PM by Juan Francisco Chen MD         High    Nonsmoker    Full code status    CVA (cerebral vascular accident) (H)    Hemiplegia as late effect of cerebrovascular accident (CVA) (H)    S/P aortic valve replacement with bioprosthetic valve       Medium    Primary hypertension    Osteoarthritis of both knees    Chronic constipation       Low    Mixed hyperlipidemia    ED (erectile dysfunction)    Chronic iritis of right eye    Dysphagia as late effect of cerebrovascular accident (CVA)    BPH (benign prostatic hyperplasia)        Current Outpatient Medications   Medication Instructions    amoxicillin (AMOXIL) 2,000 mg, Oral, ONCE PRN    aspirin (ASA) 81 mg, DAILY    atorvastatin (LIPITOR) 80 mg, Oral, DAILY    cholecalciferol, vitamin D3, 2,000 unit Tab  "[CHOLECALCIFEROL, VITAMIN D3, 2,000 UNIT TAB] Take by mouth.    metoprolol succinate ER (TOPROL XL) 25 mg, Oral, DAILY    tamsulosin (FLOMAX) 0.4 mg, Oral, DAILY        Social History     Social History Narrative    Patient of Dr. Chen since 2015.  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    [place] in [place].       Subjective:     Aguilar Garcia is a 74 year old male who comes in today for:    Chief Complaint   Patient presents with    Physical    Lab     Pt states that he is fasting            5/17/2024     9:25 AM   Additional Questions   Roomed by JAIME Huynh   Accompanied by Self     In for yearly follow up of health issues.    Doing well at this time without issue.    Aortic valve has been doing well overall without significant issue.  No dizziness and no shortness of breath or presyncope.    Blood pressure doing well on the metoprolol - - no dizziness.    Cholesterol doing okay but will follow up blood work.    Some issues with constipation and managing this better.    No new cerebrovascular accident (stroke) symptoms.  Still deals with left sided weakness especially in the left upper extremity.    We reviewed his other issues noted in the assessment but not specifically addressed in the HPI above.     Objective:     Wt Readings from Last 3 Encounters:   05/17/24 76.2 kg (168 lb 1.6 oz)   05/18/23 73.9 kg (163 lb)   05/05/22 75 kg (165 lb 6.4 oz)     BP Readings from Last 3 Encounters:   05/17/24 100/68   05/18/23 110/79   05/05/22 116/74     /68 (BP Location: Right arm, Patient Position: Sitting, Cuff Size: Adult Regular)   Pulse 82   Temp 98  F (36.7  C) (Oral)   Resp 20   Ht 1.753 m (5' 9\")   Wt 76.2 kg (168 lb 1.6 oz)   SpO2 93%   BMI 24.82 kg/m     The patient is comfortable, no acute distress.  Mood good.  Insight good.  Eyes are nonicteric.  Neck is supple without mass.  No cervical adenopathy.  No thyromegaly. Heart regular rate and rhythm.  Lungs clear to auscultation bilaterally.  " Respiratory effort is good.  Abdomen soft and nontender.  No hepatosplenomegaly.  Extremities no edema.  Left hemiparesis.      Diagnostics:     Office Visit on 05/18/2023   Component Date Value Ref Range Status    Sodium 05/18/2023 143  136 - 145 mmol/L Final    Potassium 05/18/2023 4.3  3.4 - 5.3 mmol/L Final    Chloride 05/18/2023 108 (H)  98 - 107 mmol/L Final    Carbon Dioxide (CO2) 05/18/2023 25  22 - 29 mmol/L Final    Anion Gap 05/18/2023 10  7 - 15 mmol/L Final    Urea Nitrogen 05/18/2023 15.6  8.0 - 23.0 mg/dL Final    Creatinine 05/18/2023 1.04  0.67 - 1.17 mg/dL Final    Calcium 05/18/2023 9.6  8.8 - 10.2 mg/dL Final    Glucose 05/18/2023 107 (H)  70 - 99 mg/dL Final    Alkaline Phosphatase 05/18/2023 104  40 - 129 U/L Final    AST 05/18/2023 46  10 - 50 U/L Final    ALT 05/18/2023 54 (H)  10 - 50 U/L Final    Protein Total 05/18/2023 7.3  6.4 - 8.3 g/dL Final    Albumin 05/18/2023 4.4  3.5 - 5.2 g/dL Final    Bilirubin Total 05/18/2023 0.7  <=1.2 mg/dL Final    GFR Estimate 05/18/2023 76  >60 mL/min/1.73m2 Final    eGFR calculated using 2021 CKD-EPI equation.    Cholesterol 05/18/2023 130  <200 mg/dL Final    Triglycerides 05/18/2023 83  <150 mg/dL Final    Direct Measure HDL 05/18/2023 51  >=40 mg/dL Final    LDL Cholesterol Calculated 05/18/2023 62  <=100 mg/dL Final    Non HDL Cholesterol 05/18/2023 79  <130 mg/dL Final    WBC Count 05/18/2023 10.6  4.0 - 11.0 10e3/uL Final    RBC Count 05/18/2023 5.12  4.40 - 5.90 10e6/uL Final    Hemoglobin 05/18/2023 16.5  13.3 - 17.7 g/dL Final    Hematocrit 05/18/2023 48.9  40.0 - 53.0 % Final    MCV 05/18/2023 96  78 - 100 fL Final    MCH 05/18/2023 32.2  26.5 - 33.0 pg Final    MCHC 05/18/2023 33.7  31.5 - 36.5 g/dL Final    RDW 05/18/2023 13.7  10.0 - 15.0 % Final    Platelet Count 05/18/2023 176  150 - 450 10e3/uL Final    Prostate Specific Antigen Screen 05/18/2023 3.82  0.00 - 6.50 ng/mL Final       Results for orders placed or performed in visit on  05/17/24   CBC with platelets     Status: Normal   Result Value Ref Range    WBC Count 8.3 4.0 - 11.0 10e3/uL    RBC Count 5.18 4.40 - 5.90 10e6/uL    Hemoglobin 16.5 13.3 - 17.7 g/dL    Hematocrit 48.4 40.0 - 53.0 %    MCV 93 78 - 100 fL    MCH 31.9 26.5 - 33.0 pg    MCHC 34.1 31.5 - 36.5 g/dL    RDW 13.6 10.0 - 15.0 %    Platelet Count 175 150 - 450 10e3/uL        Assessment and Plan:     1. Hemiplegia as late effect of cerebrovascular accident (CVA) (H)  Stable at this time.  Physical therapy as needed.  Continue to monitor.    2. Cerebrovascular accident (CVA) due to embolism of right middle cerebral artery (H)  No new symptoms.  Continue to monitor.  Follow up blood work.    - Comprehensive metabolic panel; Future  - CBC with platelets; Future  - Lipid panel reflex to direct LDL Fasting; Future  - Comprehensive metabolic panel  - CBC with platelets  - Lipid panel reflex to direct LDL Fasting    3. S/P aortic valve replacement with bioprosthetic valve  Doing well.  Echocardiogram as needed.    4. Primary osteoarthritis of both knees    5. Chronic constipation    6. Mixed hyperlipidemia    - atorvastatin (LIPITOR) 80 MG tablet; Take 1 tablet (80 mg) by mouth daily  Dispense: 90 tablet; Refill: 3    7. Essential hypertension    - metoprolol succinate ER (TOPROL XL) 25 MG 24 hr tablet; Take 1 tablet (25 mg) by mouth daily  Dispense: 90 tablet; Refill: 3    8. Benign prostatic hyperplasia with urinary obstruction    - tamsulosin (FLOMAX) 0.4 MG capsule; Take 1 capsule (0.4 mg) by mouth daily  Dispense: 90 capsule; Refill: 3    9. Primary hypertension      Continue current medications otherwise.  Follow up sooner if issues.          Juan Francisco Chen MD  General Internal Medicine  [facility]    The longitudinal plan of care for the diagnoses and conditions as documented were addressed during this visit. Due to the added complexity in care, I will continue to support Win in the subsequent  management and with ongoing continuity of care.     Return in about 1 year (around 5/17/2025) for annual wellness visit, visit and blood work.     No future appointments.      HCC issues resolved at this visit.  Wt Readings from Last 20 Encounters:   05/17/24 76.2 kg (168 lb 1.6 oz)   05/18/23 73.9 kg (163 lb)   05/05/22 75 kg (165 lb 6.4 oz)   05/24/21 75.3 kg (166 lb)   07/31/20 81.2 kg (179 lb)   06/07/19 108.7 kg (239 lb 11.2 oz)   06/05/18 108.3 kg (238 lb 12.8 oz)   06/06/17 113.6 kg (250 lb 6.4 oz)   06/29/16 114.2 kg (251 lb 12.8 oz)   06/29/15 113.7 kg (250 lb 9.6 oz)     BP Readings from Last 20 Encounters:   05/17/24 100/68   05/18/23 110/79   05/05/22 116/74   05/24/21 116/70   07/31/20 118/66   03/27/20 118/72   12/09/19 122/64      Pulse Readings from Last 20 Encounters:   05/17/24 82   05/18/23 79   05/05/22 88   05/24/21 86   07/31/20 80   03/27/20 72   12/09/19 101     SpO2 Readings from Last 20 Encounters:   05/17/24 93%   05/18/23 96%   05/05/22 98%   05/24/21 97%   07/31/20 95%   03/27/20 95%   12/09/19 95%

## 2024-05-17 NOTE — PROGRESS NOTES
Preventive Care Visit  [facility]  Juan Francisco Chen MD, Internal Medicine  May 17, 2024  {Provider  Link to SmartSet :954986}    {PROVIDER CHARTING PREFERENCE:574451}    Patti Almonte is a 74 year old, presenting for the following:  Physical and Lab (Pt states that he is fasting )        5/17/2024     9:25 AM   Additional Questions   Roomed by JAIME Huynh   Accompanied by Self     {ROOMER if patient is in their first year of Medicare a vision screen is required click here to document the Vison screen and then refresh the note to pull in results  :626172}    Health Care Directive  Patient has a Health Care Directive on file  Advance care planning document is on file and is current.    HPI  ***  {MA/LPN/RN Pre-Provider Visit Orders- hCG/UA/Strep (Optional):479168}  {SUPERLIST (Optional):608913}  {additonal problems for provider to add (Optional):578636}      5/10/2024   General Health   How would you rate your overall physical health? Good   Feel stress (tense, anxious, or unable to sleep) To some extent   (!) STRESS CONCERN      5/10/2024   Nutrition   Diet: Regular (no restrictions)         5/10/2024   Exercise   Days per week of moderate/strenous exercise 0 days   Average minutes spent exercising at this level 0 min   (!) EXERCISE CONCERN      5/10/2024   Social Factors   Frequency of gathering with friends or relatives Twice a week   Worry food won't last until get money to buy more No   Food not last or not have enough money for food? No   Do you have housing?  Yes   Are you worried about losing your housing? No   Lack of transportation? No   Unable to get utilities (heat,electricity)? No         5/17/2024   Fall Risk   Reason Gait Speed Test Not Completed Patient verbalizes unable to perform test          5/10/2024   Activities of Daily Living- Home Safety   Needs help with the following daily activites Transportation    Shopping    Preparing meals    Housework    Bathing    Laundry     Medication administration    Money management    Dressing   Safety concerns in the home None of the above         5/10/2024   Dental   Dentist two times every year? Yes         5/10/2024   Hearing Screening   Hearing concerns? None of the above         5/10/2024   Driving Risk Screening   Patient/family members have concerns about driving (!) YES ***         5/10/2024   General Alertness/Fatigue Screening   Have you been more tired than usual lately? No         5/10/2024   Urinary Incontinence Screening   Bothered by leaking urine in past 6 months No         5/10/2024   TB Screening   Were you born outside of the [place]? No       Today's PHQ-9 Score:       5/16/2024     9:45 AM   PHQ-9 SCORE   PHQ-9 Total Score MyChart 2 (Minimal depression)   PHQ-9 Total Score 2         5/10/2024   Substance Use   Alcohol more than 3/day or more than 7/wk No   Do you have a current opioid prescription? No   How severe/bad is pain from 1 to 10? 2/10   Do you use any other substances recreationally? No     Social History     Tobacco Use    Smoking status: Never    Smokeless tobacco: Never   Substance Use Topics    Alcohol use: Yes     {Provider  If there are gaps in the social history shown above, please follow the link to update and then refresh the note Link to Social and Substance History :890110}      5/10/2024   AAA Screening   Family history of Abdominal Aortic Aneurysm (AAA)? No   ASCVD Risk   The ASCVD Risk score (Noni ASHER, et al., 2019) failed to calculate for the following reasons:    The patient has a prior MI or stroke diagnosis    {Link to Fracture Risk Assessment Tool (Optional):679265}    {Provider  Use the storyboard to review patient history, after sections have been marked as reviewed, refresh note to capture documentation:014934}  {Provider   REQUIRED AWV use this link to review and update sexual activity history  after section has been marked as reviewed, refresh note to capture  "documentation:988263}  Reviewed and updated as needed this visit by Provider                    {HISTORY OPTIONS (Optional):277467}  Current providers sharing in care for this patient include:  Patient Care Team:  Juan Francisco Chen MD as PCP - General  Juan Francisco Chen MD as Assigned PCP  Rocco Solis MD as MD    The following health maintenance items are reviewed in Epic and correct as of today:  Health Maintenance   Topic Date Due    ANNUAL REVIEW OF HM ORDERS  Never done    COVID-19 Vaccine (6 - 2023-24 season) 03/06/2024    ZOSTER IMMUNIZATION (3 of 3) 01/01/2024    MEDICARE ANNUAL WELLNESS VISIT  05/18/2024    LIPID  05/18/2024    DTAP/TDAP/TD IMMUNIZATION (1 - Tdap) 05/05/2032 (Originally 8/1/2020)    PHQ-9  11/17/2024    FALL RISK ASSESSMENT  05/17/2025    GLUCOSE  05/18/2026    COLORECTAL CANCER SCREENING  06/01/2027    ADVANCE CARE PLANNING  05/17/2029    INFLUENZA VACCINE  Completed    Pneumococcal Vaccine: 65+ Years  Completed    RSV VACCINE (Pregnancy & 60+)  Completed    DEPRESSION ACTION PLAN  Addressed    IPV IMMUNIZATION  Aged Out    HPV IMMUNIZATION  Aged Out    MENINGITIS IMMUNIZATION  Aged Out    RSV MONOCLONAL ANTIBODY  Aged Out    HEPATITIS C SCREENING  Discontinued       {ROS Picklists (Optional):055354}     Objective    Exam  /68 (BP Location: Right arm, Patient Position: Sitting, Cuff Size: Adult Regular)   Pulse 82   Temp 98  F (36.7  C) (Oral)   Resp 20   Ht 1.753 m (5' 9\")   Wt 76.2 kg (168 lb 1.6 oz)   SpO2 93%   BMI 24.82 kg/m     Estimated body mass index is 24.82 kg/m  as calculated from the following:    Height as of this encounter: 1.753 m (5' 9\").    Weight as of this encounter: 76.2 kg (168 lb 1.6 oz).    Physical Exam  {Exam Choices (Optional):922776}         5/17/2024   Mini Cog   Clock Draw Score 2 Normal   3 Item Recall 3 objects recalled   Mini Cog Total Score 5     {A Mini-Cog total score of 0-2 suggests the possibility of dementia, score of 3-5 suggests no " dementia:742724}         Signed Electronically by: Juan Francisco Chen MD  {Email feedback regarding this note to [email or web address]   :314325}  Answers submitted by the patient for this visit:  Patient Health Questionnaire (Submitted on 5/16/2024)  If you checked off any problems, how difficult have these problems made it for you to do your work, take care of things at home, or get along with other people?: Not difficult at all  PHQ9 TOTAL SCORE: 2

## 2024-05-17 NOTE — PATIENT INSTRUCTIONS
Learning About Activities of Daily Living  What are activities of daily living?     Activities of daily living (ADLs) are the basic self-care tasks you do every day. These include eating, bathing, dressing, and moving around.  As you age, and if you have health problems, you may find that it's harder to do some of these tasks. If so, your doctor can suggest ideas that may help.  To measure what kind of help you may need, your doctor will ask how well you are able to do ADLs. Let your doctor know if there are any tasks that you are having trouble doing. This is an important first step to getting help. And when you have the help you need, you can stay as independent as possible.  How will a doctor assess your ADLs?  Asking about ADLs is part of a routine health checkup your doctor will likely do as you age. Your health check might be done in a doctor's office, in your home, or at a hospital. The goal is to find out if you are having any problems that could make it hard to care for yourself or that make it unsafe for you to be on your own.  To measure your ADLs, your doctor will ask how hard it is for you to do routine tasks. Your doctor may also want to know if you have changed the way you do a task because of a health problem. Your doctor may watch how you:  Walk back and forth.  Keep your balance while you stand or walk.  Move from sitting to standing or from a bed to a chair.  Button or unbutton a shirt or sweater.  Remove and put on your shoes.  It's common to feel a little worried or anxious if you find you can't do all the things you used to be able to do. Talking with your doctor about ADLs is a way to make sure you're as safe as possible and able to care for yourself as well as you can. You may want to bring a caregiver, friend, or family member to your checkup. They can help you talk to your doctor.  Follow-up care is a key part of your treatment and safety. Be sure to make and go to all appointments, and  call your doctor if you are having problems. It's also a good idea to know your test results and keep a list of the medicines you take.  Current as of: October 24, 2023               Content Version: 14.0    5015-3770 [employer].   Care instructions adapted under license by your healthcare professional. If you have questions about a medical condition or this instruction, always ask your healthcare professional. [employer] disclaims any warranty or liability for your use of this information.      Preventing Falls: Care Instructions  Injuries and health problems such as trouble walking or poor eyesight can increase your risk of falling. So can some medicines. But there are things you can do to help prevent falls. You can exercise to get stronger. You can also arrange your home to make it safer.    Talk to your doctor about the medicines you take. Ask if any of them increase the risk of falls and whether they can be changed or stopped.   Try to exercise regularly. It can help improve your strength and balance. This can help lower your risk of falling.     Practice fall safety and prevention.    Wear low-heeled shoes that fit well and give your feet good support. Talk to your doctor if you have foot problems that make this hard.  Carry a cellphone or wear a medical alert device that you can use to call for help.  Use stepladders instead of chairs to reach high objects. Don't climb if you're at risk for falls. Ask for help, if needed.  Wear the correct eyeglasses, if you need them.    Make your home safer.    Remove rugs, cords, clutter, and furniture from walkways.  Keep your house well lit. Use night-lights in hallways and bathrooms.  Install and use sturdy handrails on stairways.  Wear nonskid footwear, even inside. Don't walk barefoot or in socks without shoes.    Be safe outside.    Use handrails, curb cuts, and ramps whenever possible.  Keep your hands free by using a shoulder bag or  "backpack.  Try to walk in well-lit areas. Watch out for uneven ground, changes in pavement, and debris.  Be careful in the winter. Walk on the grass or gravel when sidewalks are slippery. Use de-icer on steps and walkways. Add non-slip devices to shoes.    Put grab bars and nonskid mats in your shower or tub and near the toilet. Try to use a shower chair or bath bench when bathing.   Get into a tub or shower by putting in your weaker leg first. Get out with your strong side first. Have a phone or medical alert device in the bathroom with you.   Where can you learn more?  Go to https://www.[employer].net/patiented  Enter G117 in the search box to learn more about \"Preventing Falls: Care Instructions.\"  Current as of: July 17, 2023               Content Version: 14.0    6713-4544 [employer].   Care instructions adapted under license by your healthcare professional. If you have questions about a medical condition or this instruction, always ask your healthcare professional. [employer] disclaims any warranty or liability for your use of this information.      Learning About Stress  What is stress?     Stress is your body's response to a hard situation. Your body can have a physical, emotional, or mental response. Stress is a fact of life for most people, and it affects everyone differently. What causes stress for you may not be stressful for someone else.  A lot of things can cause stress. You may feel stress when you go on a job interview, take a test, or run a race. This kind of short-term stress is normal and even useful. It can help you if you need to work hard or react quickly. For example, stress can help you finish an important job on time.  Long-term stress is caused by ongoing stressful situations or events. Examples of long-term stress include long-term health problems, ongoing problems at work, or conflicts in your family. Long-term stress can harm your health.  How does stress " affect your health?  When you are stressed, your body responds as though you are in danger. It makes hormones that speed up your heart, make you breathe faster, and give you a burst of energy. This is called the fight-or-flight stress response. If the stress is over quickly, your body goes back to normal and no harm is done.  But if stress happens too often or lasts too long, it can have bad effects. Long-term stress can make you more likely to get sick, and it can make symptoms of some diseases worse. If you tense up when you are stressed, you may develop neck, shoulder, or low back pain. Stress is linked to high blood pressure and heart disease.  Stress also harms your emotional health. It can make you moya, tense, or depressed. Your relationships may suffer, and you may not do well at work or school.  What can you do to manage stress?  You can try these things to help manage stress:   Do something active. Exercise or activity can help reduce stress. Walking is a great way to get started. Even everyday activities such as housecleaning or yard work can help.  Try yoga or aamnda chi. These techniques combine exercise and meditation. You may need some training at first to learn them.  Do something you enjoy. For example, listen to music or go to a movie. Practice your hobby or do volunteer work.  Meditate. This can help you relax, because you are not worrying about what happened before or what may happen in the future.  Do guided imagery. Imagine yourself in any setting that helps you feel calm. You can use online videos, books, or a teacher to guide you.  Do breathing exercises. For example:  From a standing position, bend forward from the waist with your knees slightly bent. Let your arms dangle close to the floor.  Breathe in slowly and deeply as you return to a standing position. Roll up slowly and lift your head last.  Hold your breath for just a few seconds in the standing position.  Breathe out slowly and bend  "forward from the waist.  Let your feelings out. Talk, laugh, cry, and express anger when you need to. Talking with supportive friends or family, a counselor, or a milly leader about your feelings is a healthy way to relieve stress. Avoid discussing your feelings with people who make you feel worse.  Write. It may help to write about things that are bothering you. This helps you find out how much stress you feel and what is causing it. When you know this, you can find better ways to cope.  What can you do to prevent stress?  You might try some of these things to help prevent stress:  Manage your time. This helps you find time to do the things you want and need to do.  Get enough sleep. Your body recovers from the stresses of the day while you are sleeping.  Get support. Your family, friends, and community can make a difference in how you experience stress.  Limit your news feed. Avoid or limit time on social media or news that may make you feel stressed.  Do something active. Exercise or activity can help reduce stress. Walking is a great way to get started.  Where can you learn more?  Go to https://www.[employer].net/patiented  Enter N032 in the search box to learn more about \"Learning About Stress.\"  Current as of: October 24, 2023               Content Version: 14.0    3171-2615 [email or web address].   Care instructions adapted under license by your healthcare professional. If you have questions about a medical condition or this instruction, always ask your healthcare professional. [email or web address] disclaims any warranty or liability for your use of this information.      "

## 2024-05-18 LAB
ALBUMIN SERPL BCG-MCNC: 4.4 G/DL (ref 3.5–5.2)
ALP SERPL-CCNC: 114 U/L (ref 40–150)
ALT SERPL W P-5'-P-CCNC: 36 U/L (ref 0–70)
ANION GAP SERPL CALCULATED.3IONS-SCNC: 10 MMOL/L (ref 7–15)
AST SERPL W P-5'-P-CCNC: 37 U/L (ref 0–45)
BILIRUB SERPL-MCNC: 0.6 MG/DL
BUN SERPL-MCNC: 16.3 MG/DL (ref 8–23)
CALCIUM SERPL-MCNC: 9.3 MG/DL (ref 8.8–10.2)
CHLORIDE SERPL-SCNC: 107 MMOL/L (ref 98–107)
CHOLEST SERPL-MCNC: 123 MG/DL
CREAT SERPL-MCNC: 1.12 MG/DL (ref 0.67–1.17)
DEPRECATED HCO3 PLAS-SCNC: 25 MMOL/L (ref 22–29)
EGFRCR SERPLBLD CKD-EPI 2021: 69 ML/MIN/1.73M2
FASTING STATUS PATIENT QL REPORTED: ABNORMAL
FASTING STATUS PATIENT QL REPORTED: NORMAL
GLUCOSE SERPL-MCNC: 112 MG/DL (ref 70–99)
HDLC SERPL-MCNC: 57 MG/DL
LDLC SERPL CALC-MCNC: 50 MG/DL
NONHDLC SERPL-MCNC: 66 MG/DL
POTASSIUM SERPL-SCNC: 4.2 MMOL/L (ref 3.4–5.3)
PROT SERPL-MCNC: 7 G/DL (ref 6.4–8.3)
SODIUM SERPL-SCNC: 142 MMOL/L (ref 135–145)
TRIGL SERPL-MCNC: 81 MG/DL

## 2024-07-27 ENCOUNTER — HEALTH MAINTENANCE LETTER (OUTPATIENT)
Age: 74
End: 2024-07-27

## 2024-08-16 DIAGNOSIS — I10 PRIMARY HYPERTENSION: Chronic | ICD-10-CM

## 2024-08-16 DIAGNOSIS — E78.2 MIXED HYPERLIPIDEMIA: ICD-10-CM

## 2024-08-16 DIAGNOSIS — N13.8 BENIGN PROSTATIC HYPERPLASIA WITH URINARY OBSTRUCTION: ICD-10-CM

## 2024-08-16 DIAGNOSIS — N40.1 BENIGN PROSTATIC HYPERPLASIA WITH URINARY OBSTRUCTION: ICD-10-CM

## 2024-08-16 RX ORDER — METOPROLOL SUCCINATE 25 MG/1
25 TABLET, EXTENDED RELEASE ORAL DAILY
Qty: 90 TABLET | Refills: 2 | Status: SHIPPED | OUTPATIENT
Start: 2024-08-16

## 2024-08-16 RX ORDER — ATORVASTATIN CALCIUM 80 MG/1
80 TABLET, FILM COATED ORAL DAILY
Qty: 90 TABLET | Refills: 2 | Status: SHIPPED | OUTPATIENT
Start: 2024-08-16

## 2024-08-16 RX ORDER — ATORVASTATIN CALCIUM 80 MG/1
TABLET, FILM COATED ORAL
Qty: 90 TABLET | Refills: 0 | OUTPATIENT
Start: 2024-08-16

## 2024-08-16 RX ORDER — TAMSULOSIN HYDROCHLORIDE 0.4 MG/1
0.4 CAPSULE ORAL DAILY
Qty: 90 CAPSULE | Refills: 2 | Status: SHIPPED | OUTPATIENT
Start: 2024-08-16

## 2024-08-16 RX ORDER — METOPROLOL SUCCINATE 25 MG/1
TABLET, EXTENDED RELEASE ORAL
Qty: 90 TABLET | Refills: 0 | OUTPATIENT
Start: 2024-08-16

## 2024-08-16 RX ORDER — TAMSULOSIN HYDROCHLORIDE 0.4 MG/1
CAPSULE ORAL
Qty: 90 CAPSULE | Refills: 0 | OUTPATIENT
Start: 2024-08-16

## 2025-05-17 SDOH — HEALTH STABILITY: PHYSICAL HEALTH: ON AVERAGE, HOW MANY DAYS PER WEEK DO YOU ENGAGE IN MODERATE TO STRENUOUS EXERCISE (LIKE A BRISK WALK)?: 0 DAYS

## 2025-05-17 SDOH — HEALTH STABILITY: PHYSICAL HEALTH: ON AVERAGE, HOW MANY MINUTES DO YOU ENGAGE IN EXERCISE AT THIS LEVEL?: 0 MIN

## 2025-05-17 ASSESSMENT — SOCIAL DETERMINANTS OF HEALTH (SDOH): HOW OFTEN DO YOU GET TOGETHER WITH FRIENDS OR RELATIVES?: TWICE A WEEK

## 2025-05-21 PROBLEM — K59.09 CHRONIC CONSTIPATION: Status: RESOLVED | Noted: 2020-08-02 | Resolved: 2025-05-21

## 2025-05-22 ENCOUNTER — OFFICE VISIT (OUTPATIENT)
Dept: INTERNAL MEDICINE | Facility: CLINIC | Age: 75
End: 2025-05-22
Payer: MEDICARE

## 2025-05-22 ENCOUNTER — MYC MEDICAL ADVICE (OUTPATIENT)
Dept: INTERNAL MEDICINE | Facility: CLINIC | Age: 75
End: 2025-05-22

## 2025-05-22 ENCOUNTER — RESULTS FOLLOW-UP (OUTPATIENT)
Dept: INTERNAL MEDICINE | Facility: CLINIC | Age: 75
End: 2025-05-22

## 2025-05-22 VITALS
DIASTOLIC BLOOD PRESSURE: 78 MMHG | TEMPERATURE: 98.3 F | HEART RATE: 74 BPM | RESPIRATION RATE: 12 BRPM | HEIGHT: 69 IN | WEIGHT: 166.8 LBS | BODY MASS INDEX: 24.71 KG/M2 | OXYGEN SATURATION: 95 % | SYSTOLIC BLOOD PRESSURE: 100 MMHG

## 2025-05-22 DIAGNOSIS — M17.0 PRIMARY OSTEOARTHRITIS OF BOTH KNEES: ICD-10-CM

## 2025-05-22 DIAGNOSIS — Z12.5 SCREENING FOR PROSTATE CANCER: ICD-10-CM

## 2025-05-22 DIAGNOSIS — N13.8 BENIGN PROSTATIC HYPERPLASIA WITH URINARY OBSTRUCTION: Primary | ICD-10-CM

## 2025-05-22 DIAGNOSIS — Z00.00 MEDICARE ANNUAL WELLNESS VISIT, SUBSEQUENT: Primary | ICD-10-CM

## 2025-05-22 DIAGNOSIS — N13.8 BENIGN PROSTATIC HYPERPLASIA WITH URINARY OBSTRUCTION: ICD-10-CM

## 2025-05-22 DIAGNOSIS — R42 ORTHOSTATIC DIZZINESS: ICD-10-CM

## 2025-05-22 DIAGNOSIS — M25.532 LEFT WRIST PAIN: ICD-10-CM

## 2025-05-22 DIAGNOSIS — N40.1 BENIGN PROSTATIC HYPERPLASIA WITH URINARY OBSTRUCTION: ICD-10-CM

## 2025-05-22 DIAGNOSIS — Z95.3 S/P AORTIC VALVE REPLACEMENT WITH BIOPROSTHETIC VALVE: ICD-10-CM

## 2025-05-22 DIAGNOSIS — M79.642 PAIN OF LEFT HAND: ICD-10-CM

## 2025-05-22 DIAGNOSIS — I69.359 HEMIPLEGIA AS LATE EFFECT OF CEREBROVASCULAR ACCIDENT (CVA) (H): ICD-10-CM

## 2025-05-22 DIAGNOSIS — I10 PRIMARY HYPERTENSION: ICD-10-CM

## 2025-05-22 DIAGNOSIS — E78.2 MIXED HYPERLIPIDEMIA: ICD-10-CM

## 2025-05-22 DIAGNOSIS — N40.1 BENIGN PROSTATIC HYPERPLASIA WITH URINARY OBSTRUCTION: Primary | ICD-10-CM

## 2025-05-22 PROBLEM — I69.391 DYSPHAGIA AS LATE EFFECT OF CEREBROVASCULAR ACCIDENT (CVA): Status: RESOLVED | Noted: 2019-10-04 | Resolved: 2025-05-22

## 2025-05-22 LAB
ALBUMIN SERPL BCG-MCNC: 4.1 G/DL (ref 3.5–5.2)
ALP SERPL-CCNC: 116 U/L (ref 40–150)
ALT SERPL W P-5'-P-CCNC: 38 U/L (ref 0–70)
ANION GAP SERPL CALCULATED.3IONS-SCNC: 11 MMOL/L (ref 7–15)
AST SERPL W P-5'-P-CCNC: 40 U/L (ref 0–45)
BILIRUB SERPL-MCNC: 0.7 MG/DL
BUN SERPL-MCNC: 12.4 MG/DL (ref 8–23)
CALCIUM SERPL-MCNC: 9.3 MG/DL (ref 8.8–10.4)
CHLORIDE SERPL-SCNC: 106 MMOL/L (ref 98–107)
CHOLEST SERPL-MCNC: 116 MG/DL
CREAT SERPL-MCNC: 1.12 MG/DL (ref 0.67–1.17)
CRP SERPL-MCNC: <3 MG/L
EGFRCR SERPLBLD CKD-EPI 2021: 69 ML/MIN/1.73M2
ERYTHROCYTE [DISTWIDTH] IN BLOOD BY AUTOMATED COUNT: 13.6 % (ref 10–15)
ERYTHROCYTE [SEDIMENTATION RATE] IN BLOOD BY WESTERGREN METHOD: 7 MM/HR (ref 0–20)
FASTING STATUS PATIENT QL REPORTED: YES
FASTING STATUS PATIENT QL REPORTED: YES
GLUCOSE SERPL-MCNC: 105 MG/DL (ref 70–99)
HCO3 SERPL-SCNC: 23 MMOL/L (ref 22–29)
HCT VFR BLD AUTO: 46.6 % (ref 40–53)
HDLC SERPL-MCNC: 51 MG/DL
HGB BLD-MCNC: 15.8 G/DL (ref 13.3–17.7)
LDLC SERPL CALC-MCNC: 48 MG/DL
MCH RBC QN AUTO: 31.5 PG (ref 26.5–33)
MCHC RBC AUTO-ENTMCNC: 33.9 G/DL (ref 31.5–36.5)
MCV RBC AUTO: 93 FL (ref 78–100)
NONHDLC SERPL-MCNC: 65 MG/DL
PLATELET # BLD AUTO: 170 10E3/UL (ref 150–450)
POTASSIUM SERPL-SCNC: 4.2 MMOL/L (ref 3.4–5.3)
PROT SERPL-MCNC: 6.7 G/DL (ref 6.4–8.3)
PSA SERPL DL<=0.01 NG/ML-MCNC: 3.42 NG/ML (ref 0–6.5)
RBC # BLD AUTO: 5.02 10E6/UL (ref 4.4–5.9)
SODIUM SERPL-SCNC: 140 MMOL/L (ref 135–145)
TRIGL SERPL-MCNC: 86 MG/DL
WBC # BLD AUTO: 8.8 10E3/UL (ref 4–11)

## 2025-05-22 RX ORDER — TAMSULOSIN HYDROCHLORIDE 0.4 MG/1
0.4 CAPSULE ORAL DAILY
Qty: 90 CAPSULE | Refills: 3 | Status: SHIPPED | OUTPATIENT
Start: 2025-05-22

## 2025-05-22 RX ORDER — ATORVASTATIN CALCIUM 80 MG/1
80 TABLET, FILM COATED ORAL DAILY
Qty: 90 TABLET | Refills: 3 | Status: SHIPPED | OUTPATIENT
Start: 2025-05-22

## 2025-05-22 RX ORDER — LOTILANER OPHTHALMIC SOLUTION 2.5 MG/ML
10 SOLUTION/ DROPS OPHTHALMIC 2 TIMES DAILY
COMMUNITY
Start: 2025-04-09

## 2025-05-22 ASSESSMENT — PAIN SCALES - GENERAL: PAINLEVEL_OUTOF10: NO PAIN (0)

## 2025-05-22 NOTE — PATIENT INSTRUCTIONS
Patient Education   Learning About Activities of Daily Living  What are activities of daily living?     Activities of daily living (ADLs) are the basic self-care tasks you do every day. These include eating, bathing, dressing, and moving around.  As you age, and if you have health problems, you may find that it's harder to do some of these tasks. If so, your doctor can suggest ideas that may help.  To measure what kind of help you may need, your doctor will ask how well you are able to do ADLs. Let your doctor know if there are any tasks that you are having trouble doing. This is an important first step to getting help. And when you have the help you need, you can stay as independent as possible.  How will a doctor assess your ADLs?  Asking about ADLs is part of a routine health checkup your doctor will likely do as you age. Your health check might be done in a doctor's office, in your home, or at a hospital. The goal is to find out if you are having any problems that could make it hard to care for yourself or that make it unsafe for you to be on your own.  To measure your ADLs, your doctor will ask how hard it is for you to do routine tasks. Your doctor may also want to know if you have changed the way you do a task because of a health problem. Your doctor may watch how you:  Walk back and forth.  Keep your balance while you stand or walk.  Move from sitting to standing or from a bed to a chair.  Button or unbutton a shirt or sweater.  Remove and put on your shoes.  It's common to feel a little worried or anxious if you find you can't do all the things you used to be able to do. Talking with your doctor about ADLs is a way to make sure you're as safe as possible and able to care for yourself as well as you can. You may want to bring a caregiver, friend, or family member to your checkup. They can help you talk to your doctor.  Follow-up care is a key part of your treatment and safety. Be sure to make and go to all  appointments, and call your doctor if you are having problems. It's also a good idea to know your test results and keep a list of the medicines you take.  Current as of: October 24, 2024  Content Version: 14.4    7789-4760 [employer].   Care instructions adapted under license by your healthcare professional. If you have questions about a medical condition or this instruction, always ask your healthcare professional. [employer] disclaims any warranty or liability for your use of this information.    Preventing Falls: Care Instructions  Injuries and health problems such as trouble walking or poor eyesight can increase your risk of falling. So can some medicines. But there are things you can do to help prevent falls. You can exercise to get stronger. You can also arrange your home to make it safer.    Talk to your doctor about the medicines you take. Ask if any of them increase the risk of falls and whether they can be changed or stopped.   Try to exercise regularly. It can help improve your strength and balance. This can help lower your risk of falling.         Practice fall safety and prevention.   Wear low-heeled shoes that fit well and give your feet good support. Talk to your doctor if you have foot problems that make this hard.  Carry a cellphone or wear a medical alert device that you can use to call for help.  Use stepladders instead of chairs to reach high objects. Don't climb if you're at risk for falls. Ask for help, if needed.  Wear the correct eyeglasses, if you need them.        Make your home safer.   Remove rugs, cords, clutter, and furniture from walkways.  Keep your house well lit. Use night-lights in hallways and bathrooms.  Install and use sturdy handrails on stairways.  Wear nonskid footwear, even inside. Don't walk barefoot or in socks without shoes.        Be safe outside.   Use handrails, curb cuts, and ramps whenever possible.  Keep your hands free by using a shoulder  "bag or backpack.  Try to walk in well-lit areas. Watch out for uneven ground, changes in pavement, and debris.  Be careful in the winter. Walk on the grass or gravel when sidewalks are slippery. Use de-icer on steps and walkways. Add non-slip devices to shoes.    Put grab bars and nonskid mats in your shower or tub and near the toilet. Try to use a shower chair or bath bench when bathing.   Get into a tub or shower by putting in your weaker leg first. Get out with your strong side first. Have a phone or medical alert device in the bathroom with you.   Where can you learn more?  Go to https://www."[employer].net/patiented  Enter G117 in the search box to learn more about \"Preventing Falls: Care Instructions.\"  Current as of: July 31, 2024  Content Version: 14.4    8537-9359 [employer].   Care instructions adapted under license by your healthcare professional. If you have questions about a medical condition or this instruction, always ask your healthcare professional. [employer] disclaims any warranty or liability for your use of this information.       "

## 2025-05-22 NOTE — PROGRESS NOTES
[facility]    Comprehensive and complex medical care - Chronic disease management - Shared decision making - Care coordination - Compassionate care    Patient advocacy - Rational deprescribing - Minimally disruptive medicine - Ethical focus - Customized care         Date of Service: 5/22/2025  Primary Provider: Juan Francisco Chen    Patient Care Team:  Juan Francisco Chen MD as PCP - General  Juan Francisco Chen MD as Assigned PCP  Rocco Solis MD as MD         Chief Complaint   Patient presents with    Annual Visit           5/22/2025     9:48 AM   Additional Questions   Roomed by Calos Aguilera   Accompanied by Self     History of Present Illness    Gage Garcia, 75 years    Urinary difficulties  - Gradual difficulty in urination, including starting and slow stream  - Uses a urinal at night, typically twice per night  - Currently taking tamsulosin    Left hand and wrist pain  - Experiences soreness in the hand and fingers, particularly at night  - Describes pain as achy  - Has a brace but does not use it regularly  - Reports numbness in the hand, likely related to a past stroke  - No burning sensation reported    Dizziness  - Experiences lightheadedness or dizziness upon standing, particularly in the morning  - Occurs daily  - Takes metoprolol, which may be related to dizziness    Constipation  - Reports difficulty initiating bowel movements  - Does not experience hard stools  - Bowel movements occur daily    Skin issues  - Reports sensation of burning skin, possibly related to dryness    Vision issues  - Has eye mites and uses prescribed eye drops    Family history  - No changes reported in family health history    General health  - No reported history of COVID-19 infection  - No breathing or chest pain issues reported    Blood pressure  - Blood pressure is looking good.               Active Problem List:  Problem List as of 5/22/2025 Reviewed: 5/22/2025  3:35 PM by  "Juan Francisco Chen MD         High    Nonsmoker    Full code status    CVA (cerebral vascular accident) (H)    Hemiplegia as late effect of cerebrovascular accident (CVA) (H)    S/P aortic valve replacement with bioprosthetic valve       Medium    Primary hypertension    Osteoarthritis of both knees       Low    Mixed hyperlipidemia    ED (erectile dysfunction)    Chronic iritis of right eye    BPH (benign prostatic hyperplasia)     Current Outpatient Medications   Medication Instructions    amoxicillin (AMOXIL) 2,000 mg, Oral, ONCE PRN    aspirin (ASA) 81 mg, DAILY    atorvastatin (LIPITOR) 80 mg, Oral, DAILY    cholecalciferol, vitamin D3, 2,000 unit Tab [CHOLECALCIFEROL, VITAMIN D3, 2,000 UNIT TAB] Take by mouth.    metoprolol succinate ER (TOPROL XL) 25 mg, Oral, DAILY    tamsulosin (FLOMAX) 0.4 mg, Oral, DAILY    XDEMVY 0.25 % SOLN 10 mLs, 2 TIMES DAILY      Social History     Occupational History    Not on file   Tobacco Use    Smoking status: Never    Smokeless tobacco: Never   Substance and Sexual Activity    Alcohol use: Yes     Comment: occasional    Drug use: Never    Sexual activity: Not Currently     Partners: Female     Birth control/protection: None             Wt Readings from Last 3 Encounters:   05/22/25 75.7 kg (166 lb 12.8 oz)   05/17/24 76.2 kg (168 lb 1.6 oz)   05/18/23 73.9 kg (163 lb)     BP Readings from Last 3 Encounters:   05/22/25 100/78   05/17/24 100/68   05/18/23 110/79     PHYSICAL EXAM  /78   Pulse 74   Temp 98.3  F (36.8  C) (Oral)   Resp 12   Ht 1.753 m (5' 9\")   Wt 75.7 kg (166 lb 12.8 oz)   SpO2 95%   BMI 24.63 kg/m     The patient is comfortable, no acute distress.  Mood good.  Insight is good.  No skin lesions or nodules of concern.  Ears clear.  Eyes are nonicteric.  Throat is clear.  Neck is supple without mass, no thyromegaly. No cervical or epitrochlear adenopathy. Heart regular rate and rhythm.  Lungs clear to auscultation bilaterally.  Respiratory effort good.  " Abdomen soft and nontender.  No hepatosplenomegaly.  Extremities show no edema.  His left hand shows not obvious synovitis.  Passive range of motion (PROM) is good without pain.          Results for orders placed or performed in visit on 05/22/25   CBC with platelets     Status: Normal   Result Value Ref Range    WBC Count 8.8 4.0 - 11.0 10e3/uL    RBC Count 5.02 4.40 - 5.90 10e6/uL    Hemoglobin 15.8 13.3 - 17.7 g/dL    Hematocrit 46.6 40.0 - 53.0 %    MCV 93 78 - 100 fL    MCH 31.5 26.5 - 33.0 pg    MCHC 33.9 31.5 - 36.5 g/dL    RDW 13.6 10.0 - 15.0 %    Platelet Count 170 150 - 450 10e3/uL   Erythrocyte sedimentation rate auto     Status: Normal   Result Value Ref Range    Erythrocyte Sedimentation Rate 7 0 - 20 mm/hr            Diagnoses managed today:    1. Medicare annual wellness visit, subsequent    2. Primary hypertension    3. Mixed hyperlipidemia    4. Benign prostatic hyperplasia with urinary obstruction    5. Hemiplegia as late effect of cerebrovascular accident (CVA) (H)    6. S/P aortic valve replacement with bioprosthetic valve    7. Primary osteoarthritis of both knees    8. Screening for prostate cancer    9. Left wrist pain    10. Pain of left hand    11. Orthostatic dizziness            Assessment & Plan     Primary hypertension  - Blood pressure is looking good.  - Monitor blood pressure. Consider adjusting metoprolol if necessary.    Mixed hyperlipidemia  - Check cholesterol levels.    Benign prostatic hyperplasia with urinary obstruction  - Difficulty urinating likely related to prostate enlargement.  - Consider finasteride to prevent future problems. Monitor test results. Discuss potential urologist referral if necessary.    S/P aortic valve replacement with bioprosthetic valve  - Valve sounds very good.  - Monitor condition.    Screening for prostate cancer  - Perform PSA test.    Left wrist pain  - Pain likely related to joints or tendons.  - Use Tylenol as needed. Consider other options if  pain persists.    Pain of left hand  - Pain likely related to joints or tendons.  - Use Tylenol as needed. Consider other options if pain persists.    Orthostatic dizziness  - Dizziness possibly related to medication.  - Consider adjusting metoprolol if necessary.    Hemiplegia as late effect of cerebrovascular accident (CVA) (H)  - Monitor for any changes in symptoms related to past stroke.    Primary osteoarthritis of both knees  - Monitor condition and manage symptoms as needed.       Continue current medications otherwise.  Follow up sooner if issues.    Orders Placed This Encounter   Procedures    COVID-19 12+ (PFIZER)    Lipid panel reflex to direct LDL Non-fasting    Comprehensive metabolic panel    CBC with platelets    Prostate Specific Antigen Screen    Erythrocyte sedimentation rate auto    CRP inflammation      Issues to follow up on:  Consider discontinue of metoprolol.  Consider use of finasteride.  Consider urology consultation if issues get worse.  Consider non-steroidal anti-inflammatory (NSAID) for hand or brace or further testing.    The longitudinal plan of care for the diagnoses and conditions as documented were addressed during this visit. Due to the added complexity in care, I will continue to support Win in the subsequent management and with ongoing continuity of care.     Juan Francisco Chen MD  General Internal Medicine  [facility]    Return in about 1 year (around 5/22/2026) for annual wellness visit, visit and blood work.     No future appointments.    ______________________________________________________________________     Additional required elements:  I have reviewed opioid use disorder and substance use disorder risk factors and made any needed referrals.  This may not apply to this individual patient, but this documentation is required by Medicare.    Counseling   Appropriate preventive services were addressed with this patient via screening,  questionnaire, or discussion as appropriate for fall prevention, nutrition, physical activity, Tobacco-use cessation, social engagement, weight loss and cognition.  Checklist reviewing preventive services available has been given to the patient.    Memory screen:      5/17/2024     9:28 AM 5/22/2025     9:52 AM   MINI COG   Clock Draw Score 2 Normal 2 Normal   3 Item Recall 3 objects recalled 1 object recalled   Mini Cog Total Score 5 3        PHQ-2 Score:       5/22/2025     9:52 AM 5/12/2023     6:29 PM   PHQ-2 ( 1999 [employer])   Q1: Little interest or pleasure in doing things 0 0   Q2: Feeling down, depressed or hopeless 0 1   PHQ-2 Score 0 1   Q1: Little interest or pleasure in doing things  Not at all   Q2: Feeling down, depressed or hopeless  Several days   PHQ-2 Score  1      Advanced care planning reviewed.     Health Maintenance   Topic Date Due    ANNUAL REVIEW OF HM ORDERS  Never done    BMP  05/17/2025    LIPID  05/17/2025    DTAP/TDAP/TD IMMUNIZATION (1 - Tdap) 05/05/2032 (Originally 8/1/2020)    MEDICARE ANNUAL WELLNESS VISIT  05/22/2026    FALL RISK ASSESSMENT  05/22/2026    DIABETES SCREENING  05/17/2027    COLORECTAL CANCER SCREENING  06/01/2027    ADVANCE CARE PLANNING  05/18/2028    PHQ-2 (once per calendar year)  Completed    INFLUENZA VACCINE  Completed    Pneumococcal Vaccine: 50+ Years  Completed    ZOSTER IMMUNIZATION  Completed    RSV VACCINE  Completed    COVID-19 Vaccine  Completed    HPV IMMUNIZATION  Aged Out    MENINGITIS IMMUNIZATION  Aged Out    HEPATITIS C SCREENING  Discontinued

## 2025-05-26 RX ORDER — FINASTERIDE 5 MG/1
5 TABLET, FILM COATED ORAL DAILY
Qty: 90 TABLET | Refills: 3 | Status: SHIPPED | OUTPATIENT
Start: 2025-05-26 | End: 2026-05-21